# Patient Record
Sex: MALE | Race: WHITE | URBAN - METROPOLITAN AREA
[De-identification: names, ages, dates, MRNs, and addresses within clinical notes are randomized per-mention and may not be internally consistent; named-entity substitution may affect disease eponyms.]

---

## 2022-11-17 ENCOUNTER — OFFICE (OUTPATIENT)
Dept: URBAN - METROPOLITAN AREA CLINIC 115 | Facility: CLINIC | Age: 62
Setting detail: OPHTHALMOLOGY
End: 2022-11-17
Payer: COMMERCIAL

## 2022-11-17 DIAGNOSIS — H25.13: ICD-10-CM

## 2022-11-17 DIAGNOSIS — E11.3393: ICD-10-CM

## 2022-11-17 PROCEDURE — 92004 COMPRE OPH EXAM NEW PT 1/>: CPT | Performed by: OPHTHALMOLOGY

## 2022-11-17 ASSESSMENT — VISUAL ACUITY
OD_BCVA: 20/25
OS_BCVA: 20/150

## 2022-11-17 ASSESSMENT — REFRACTION_AUTOREFRACTION
OS_CYLINDER: -0.75
OD_AXIS: 105
OD_CYLINDER: -0.50
OD_SPHERE: +3.25
OS_SPHERE: +0.50
OS_AXIS: 082

## 2022-11-17 ASSESSMENT — SPHEQUIV_DERIVED
OD_SPHEQUIV: 3
OS_SPHEQUIV: 0.125

## 2022-11-17 ASSESSMENT — CONFRONTATIONAL VISUAL FIELD TEST (CVF)
OS_FINDINGS: FULL
OD_FINDINGS: FULL

## 2022-11-17 ASSESSMENT — TONOMETRY
OS_IOP_MMHG: 20
OD_IOP_MMHG: 17

## 2023-01-04 ENCOUNTER — RX ONLY (RX ONLY)
Age: 63
End: 2023-01-04

## 2023-01-04 ENCOUNTER — OFFICE (OUTPATIENT)
Dept: URBAN - METROPOLITAN AREA CLINIC 94 | Facility: CLINIC | Age: 63
Setting detail: OPHTHALMOLOGY
End: 2023-01-04
Payer: COMMERCIAL

## 2023-01-04 DIAGNOSIS — E11.3513: ICD-10-CM

## 2023-01-04 PROCEDURE — 92134 CPTRZ OPH DX IMG PST SGM RTA: CPT | Performed by: OPHTHALMOLOGY

## 2023-01-04 PROCEDURE — 92235 FLUORESCEIN ANGRPH MLTIFRAME: CPT | Performed by: OPHTHALMOLOGY

## 2023-01-04 PROCEDURE — 92012 INTRM OPH EXAM EST PATIENT: CPT | Performed by: OPHTHALMOLOGY

## 2023-01-04 ASSESSMENT — CONFRONTATIONAL VISUAL FIELD TEST (CVF)
OD_FINDINGS: FULL
OS_FINDINGS: FULL

## 2023-01-04 ASSESSMENT — SPHEQUIV_DERIVED
OS_SPHEQUIV: 0.125
OD_SPHEQUIV: 3

## 2023-01-04 ASSESSMENT — VISUAL ACUITY
OD_BCVA: 20/20-1
OS_BCVA: 20/100+1

## 2023-01-04 ASSESSMENT — REFRACTION_AUTOREFRACTION
OS_SPHERE: +0.50
OS_CYLINDER: -0.75
OS_AXIS: 082
OD_CYLINDER: -0.50
OD_SPHERE: +3.25
OD_AXIS: 105

## 2023-01-04 ASSESSMENT — TONOMETRY
OD_IOP_MMHG: 21
OS_IOP_MMHG: 18

## 2023-01-18 ENCOUNTER — OFFICE (OUTPATIENT)
Dept: URBAN - METROPOLITAN AREA CLINIC 105 | Facility: CLINIC | Age: 63
Setting detail: OPHTHALMOLOGY
End: 2023-01-18
Payer: COMMERCIAL

## 2023-01-18 DIAGNOSIS — E11.3511: ICD-10-CM

## 2023-01-18 DIAGNOSIS — E11.3512: ICD-10-CM

## 2023-01-18 PROCEDURE — 67028 INJECTION EYE DRUG: CPT | Performed by: OPHTHALMOLOGY

## 2023-01-18 PROCEDURE — 92134 CPTRZ OPH DX IMG PST SGM RTA: CPT | Performed by: OPHTHALMOLOGY

## 2023-01-18 ASSESSMENT — SPHEQUIV_DERIVED
OD_SPHEQUIV: 3
OS_SPHEQUIV: 0.125

## 2023-01-18 ASSESSMENT — REFRACTION_AUTOREFRACTION
OS_AXIS: 082
OD_SPHERE: +3.25
OD_CYLINDER: -0.50
OS_SPHERE: +0.50
OS_CYLINDER: -0.75
OD_AXIS: 105

## 2023-01-18 ASSESSMENT — VISUAL ACUITY
OS_BCVA: 20/200
OD_BCVA: 20/25-1

## 2023-01-18 ASSESSMENT — CONFRONTATIONAL VISUAL FIELD TEST (CVF)
OS_FINDINGS: FULL
OD_FINDINGS: FULL

## 2023-01-25 ENCOUNTER — OFFICE (OUTPATIENT)
Dept: URBAN - METROPOLITAN AREA CLINIC 94 | Facility: CLINIC | Age: 63
Setting detail: OPHTHALMOLOGY
End: 2023-01-25
Payer: COMMERCIAL

## 2023-01-25 DIAGNOSIS — E11.3512: ICD-10-CM

## 2023-01-25 DIAGNOSIS — E11.3511: ICD-10-CM

## 2023-01-25 PROCEDURE — 67028 INJECTION EYE DRUG: CPT | Performed by: OPHTHALMOLOGY

## 2023-01-25 PROCEDURE — 92134 CPTRZ OPH DX IMG PST SGM RTA: CPT | Performed by: OPHTHALMOLOGY

## 2023-01-25 ASSESSMENT — REFRACTION_AUTOREFRACTION
OD_AXIS: 105
OS_SPHERE: +0.50
OS_CYLINDER: -0.75
OD_SPHERE: +3.25
OS_AXIS: 082
OD_CYLINDER: -0.50

## 2023-01-25 ASSESSMENT — VISUAL ACUITY
OS_BCVA: 20/200
OD_BCVA: 20/25-1

## 2023-01-25 ASSESSMENT — SPHEQUIV_DERIVED
OS_SPHEQUIV: 0.125
OD_SPHEQUIV: 3

## 2023-02-07 ENCOUNTER — ASC (OUTPATIENT)
Dept: URBAN - METROPOLITAN AREA SURGERY 8 | Facility: SURGERY | Age: 63
Setting detail: OPHTHALMOLOGY
End: 2023-02-07
Payer: COMMERCIAL

## 2023-02-07 DIAGNOSIS — E11.3511: ICD-10-CM

## 2023-02-07 PROCEDURE — 67210 TREATMENT OF RETINAL LESION: CPT | Performed by: OPHTHALMOLOGY

## 2023-02-07 ASSESSMENT — TONOMETRY
OD_IOP_MMHG: 18
OS_IOP_MMHG: 20

## 2023-02-07 ASSESSMENT — CONFRONTATIONAL VISUAL FIELD TEST (CVF)
OD_FINDINGS: FULL
OS_FINDINGS: FULL

## 2023-02-08 PROBLEM — E11.3512 DM TYPE 2; RIGHT PROLIFERATIVE WITH ME, LEFT PROLIFERATIVE WITH ME: Status: ACTIVE | Noted: 2023-01-04

## 2023-02-08 PROBLEM — E11.3511 DM TYPE 2; RIGHT PROLIFERATIVE WITH ME, LEFT PROLIFERATIVE WITH ME: Status: ACTIVE | Noted: 2023-01-04

## 2023-02-08 ASSESSMENT — SPHEQUIV_DERIVED
OS_SPHEQUIV: 0.125
OD_SPHEQUIV: 3

## 2023-02-08 ASSESSMENT — REFRACTION_AUTOREFRACTION
OD_AXIS: 105
OD_SPHERE: +3.25
OS_SPHERE: +0.50
OS_CYLINDER: -0.75
OS_AXIS: 082
OD_CYLINDER: -0.50

## 2023-02-08 ASSESSMENT — VISUAL ACUITY
OS_BCVA: 20/100
OD_BCVA: 20/25-1

## 2023-02-15 ENCOUNTER — OFFICE (OUTPATIENT)
Dept: URBAN - METROPOLITAN AREA CLINIC 105 | Facility: CLINIC | Age: 63
Setting detail: OPHTHALMOLOGY
End: 2023-02-15
Payer: COMMERCIAL

## 2023-02-15 DIAGNOSIS — E11.3512: ICD-10-CM

## 2023-02-15 PROCEDURE — 67210 TREATMENT OF RETINAL LESION: CPT | Performed by: OPHTHALMOLOGY

## 2023-02-15 ASSESSMENT — REFRACTION_AUTOREFRACTION
OD_CYLINDER: -0.50
OD_SPHERE: +3.25
OS_SPHERE: +0.50
OS_CYLINDER: -0.75
OS_AXIS: 082
OD_AXIS: 105

## 2023-02-15 ASSESSMENT — SPHEQUIV_DERIVED
OS_SPHEQUIV: 0.125
OD_SPHEQUIV: 3

## 2023-02-15 ASSESSMENT — VISUAL ACUITY
OS_BCVA: 20/200
OD_BCVA: 20/30-

## 2023-02-15 ASSESSMENT — CONFRONTATIONAL VISUAL FIELD TEST (CVF)
OS_FINDINGS: FULL
OD_FINDINGS: FULL

## 2023-03-24 ENCOUNTER — OFFICE (OUTPATIENT)
Dept: URBAN - METROPOLITAN AREA CLINIC 105 | Facility: CLINIC | Age: 63
Setting detail: OPHTHALMOLOGY
End: 2023-03-24
Payer: COMMERCIAL

## 2023-03-24 DIAGNOSIS — E11.3512: ICD-10-CM

## 2023-03-24 DIAGNOSIS — E11.3511: ICD-10-CM

## 2023-03-24 PROCEDURE — 67028 INJECTION EYE DRUG: CPT | Performed by: OPHTHALMOLOGY

## 2023-03-24 PROCEDURE — 92134 CPTRZ OPH DX IMG PST SGM RTA: CPT | Performed by: OPHTHALMOLOGY

## 2023-03-24 ASSESSMENT — REFRACTION_AUTOREFRACTION
OS_CYLINDER: -0.75
OD_SPHERE: +3.25
OS_AXIS: 082
OD_CYLINDER: -0.50
OD_AXIS: 105
OS_SPHERE: +0.50

## 2023-03-24 ASSESSMENT — VISUAL ACUITY
OD_BCVA: 20/30
OS_BCVA: 20/100-

## 2023-03-24 ASSESSMENT — SPHEQUIV_DERIVED
OS_SPHEQUIV: 0.125
OD_SPHEQUIV: 3

## 2023-03-27 ENCOUNTER — OFFICE (OUTPATIENT)
Dept: URBAN - METROPOLITAN AREA CLINIC 105 | Facility: CLINIC | Age: 63
Setting detail: OPHTHALMOLOGY
End: 2023-03-27
Payer: COMMERCIAL

## 2023-03-27 DIAGNOSIS — E11.3512: ICD-10-CM

## 2023-03-27 DIAGNOSIS — E11.3511: ICD-10-CM

## 2023-03-27 PROCEDURE — 67028 INJECTION EYE DRUG: CPT | Performed by: OPHTHALMOLOGY

## 2023-03-27 PROCEDURE — 92134 CPTRZ OPH DX IMG PST SGM RTA: CPT | Performed by: OPHTHALMOLOGY

## 2023-03-27 ASSESSMENT — VISUAL ACUITY
OD_BCVA: 20/25-1
OS_BCVA: 20/100-

## 2023-03-27 ASSESSMENT — SPHEQUIV_DERIVED
OS_SPHEQUIV: 0.125
OD_SPHEQUIV: 3

## 2023-03-27 ASSESSMENT — REFRACTION_AUTOREFRACTION
OS_CYLINDER: -0.75
OS_AXIS: 082
OD_SPHERE: +3.25
OD_CYLINDER: -0.50
OD_AXIS: 105
OS_SPHERE: +0.50

## 2023-03-27 ASSESSMENT — CONFRONTATIONAL VISUAL FIELD TEST (CVF)
OS_FINDINGS: FULL
OD_FINDINGS: FULL

## 2023-04-14 ENCOUNTER — EMERGENCY (EMERGENCY)
Facility: HOSPITAL | Age: 63
LOS: 1 days | Discharge: LEFT BEFORE DISCHARGE | End: 2023-04-14
Attending: EMERGENCY MEDICINE | Admitting: EMERGENCY MEDICINE
Payer: COMMERCIAL

## 2023-04-14 VITALS
SYSTOLIC BLOOD PRESSURE: 143 MMHG | RESPIRATION RATE: 18 BRPM | OXYGEN SATURATION: 99 % | TEMPERATURE: 98 F | HEART RATE: 93 BPM | DIASTOLIC BLOOD PRESSURE: 79 MMHG

## 2023-04-14 VITALS
RESPIRATION RATE: 17 BRPM | OXYGEN SATURATION: 100 % | TEMPERATURE: 98 F | DIASTOLIC BLOOD PRESSURE: 74 MMHG | HEART RATE: 92 BPM | SYSTOLIC BLOOD PRESSURE: 150 MMHG

## 2023-04-14 PROBLEM — Z00.00 ENCOUNTER FOR PREVENTIVE HEALTH EXAMINATION: Status: ACTIVE | Noted: 2023-04-14

## 2023-04-14 LAB
ALBUMIN SERPL ELPH-MCNC: 4.5 G/DL — SIGNIFICANT CHANGE UP (ref 3.3–5)
ALP SERPL-CCNC: 99 U/L — SIGNIFICANT CHANGE UP (ref 40–120)
ALT FLD-CCNC: 19 U/L — SIGNIFICANT CHANGE UP (ref 4–41)
ANION GAP SERPL CALC-SCNC: 20 MMOL/L — HIGH (ref 7–14)
AST SERPL-CCNC: 20 U/L — SIGNIFICANT CHANGE UP (ref 4–40)
BILIRUB SERPL-MCNC: 0.7 MG/DL — SIGNIFICANT CHANGE UP (ref 0.2–1.2)
BUN SERPL-MCNC: 15 MG/DL — SIGNIFICANT CHANGE UP (ref 7–23)
CALCIUM SERPL-MCNC: 10 MG/DL — SIGNIFICANT CHANGE UP (ref 8.4–10.5)
CHLORIDE SERPL-SCNC: 96 MMOL/L — LOW (ref 98–107)
CO2 SERPL-SCNC: 21 MMOL/L — LOW (ref 22–31)
CREAT SERPL-MCNC: 0.74 MG/DL — SIGNIFICANT CHANGE UP (ref 0.5–1.3)
EGFR: 102 ML/MIN/1.73M2 — SIGNIFICANT CHANGE UP
GLUCOSE SERPL-MCNC: 166 MG/DL — HIGH (ref 70–99)
HCT VFR BLD CALC: 49.6 % — SIGNIFICANT CHANGE UP (ref 39–50)
HGB BLD-MCNC: 16.6 G/DL — SIGNIFICANT CHANGE UP (ref 13–17)
MCHC RBC-ENTMCNC: 29.8 PG — SIGNIFICANT CHANGE UP (ref 27–34)
MCHC RBC-ENTMCNC: 33.5 GM/DL — SIGNIFICANT CHANGE UP (ref 32–36)
MCV RBC AUTO: 89 FL — SIGNIFICANT CHANGE UP (ref 80–100)
NRBC # BLD: 0 /100 WBCS — SIGNIFICANT CHANGE UP (ref 0–0)
NRBC # FLD: 0 K/UL — SIGNIFICANT CHANGE UP (ref 0–0)
PLATELET # BLD AUTO: 312 K/UL — SIGNIFICANT CHANGE UP (ref 150–400)
POTASSIUM SERPL-MCNC: 4.2 MMOL/L — SIGNIFICANT CHANGE UP (ref 3.5–5.3)
POTASSIUM SERPL-SCNC: 4.2 MMOL/L — SIGNIFICANT CHANGE UP (ref 3.5–5.3)
PROT SERPL-MCNC: 8.3 G/DL — SIGNIFICANT CHANGE UP (ref 6–8.3)
RBC # BLD: 5.57 M/UL — SIGNIFICANT CHANGE UP (ref 4.2–5.8)
RBC # FLD: 13 % — SIGNIFICANT CHANGE UP (ref 10.3–14.5)
SODIUM SERPL-SCNC: 137 MMOL/L — SIGNIFICANT CHANGE UP (ref 135–145)
WBC # BLD: 12.84 K/UL — HIGH (ref 3.8–10.5)
WBC # FLD AUTO: 12.84 K/UL — HIGH (ref 3.8–10.5)

## 2023-04-14 PROCEDURE — 99285 EMERGENCY DEPT VISIT HI MDM: CPT

## 2023-04-14 PROCEDURE — 99284 EMERGENCY DEPT VISIT MOD MDM: CPT

## 2023-04-14 NOTE — ED PROVIDER NOTE - OBJECTIVE STATEMENT
Patient with recent sinus surgery with polyp removal by Dr. Lenny Sanchez ([phone]). Patient had surgery 3 days ago and had stents removed in the office this morning, but kaushik-orbital swelling was appreciated on exam. Patient sent to the ER after Dr. Sanchez discussed with Dr. Warner for evaluation in the ER. I discussed patient with ENT resident who is consulting on patient in the ER. ENT requesting Patient with recent sinus surgery with polyp removal by Dr. Lenny Sanchez ([phone]). Patient had surgery 3 days ago and had stents removed in the office this morning, but kaushik-orbital swelling was appreciated on exam. Patient sent to the ER after Dr. Sanchez discussed with Dr. Warner for evaluation in the ER. I discussed patient with ENT resident who is consulting on patient in the ER. ENT requesting Ophthalmology consult which was requested. Patient without fever and currently without pain. Patient had pain yesterday that was relieved with stent removal today. Last tylenol was 3 hours ago.  No n/v, no sob, no cough. Patient reports periorbital swelling worse yesterday and improved today.

## 2023-04-14 NOTE — ED PROVIDER NOTE - CLINICAL SUMMARY MEDICAL DECISION MAKING FREE TEXT BOX
See HPI. Awaiting lab, CT results. ENT to consult. Ophtlal to consult. Have discussed with Dr. Sanchez.  Patient currently without pain. See HPI. Awaiting lab, CT results. ENT to consult. Ophtlal to consult. Have discussed with Dr. Sanchez.  Patient currently without pain.    16:10: Patient signed out to Dr. Connell. Patient has been awaiting to obtain a CT but would like to opt for an outpatient CT and not wait any longer. Discussed with patient following up with Dr. Sanchez to schedule an outpatient CT although he is currently 2nd on the list to obtain a CT. Patient symptoms are improving and is on Doxycycline however I reiterated why we are requesting the CT to fully evaluate if there is an infection. I convinced Mr. Wilson to continue to wait another 30 min while we attempt to obtain the CT and evaluate his sinus area more thoroughly.  Patient is not in any pain or discomfort.

## 2023-04-14 NOTE — ED ADULT TRIAGE NOTE - CHIEF COMPLAINT QUOTE
sinus surgery 4/11, swelling redness ,pain l eye since yesterday with discharge.  denies visual disturbance.  fs [phone]

## 2023-04-14 NOTE — CONSULT NOTE ADULT - ATTENDING COMMENTS
62y male with recent FESS 3 days ago presenting with left periorbital swelling and chemosis OS of 1 day duration. F/u CT maxillofacial. Vision intact and no evidence of optic nerve dysfunction. Continue antibiotics. Agree with plan above.

## 2023-04-14 NOTE — ED PROVIDER NOTE - PATIENT PORTAL LINK FT
You can access the FollowMyHealth Patient Portal offered by [facility] by registering at the following website: [email or web address]. By joining [facility]’s FollowMyHealth portal, you will also be able to view your health information using other applications (apps) compatible with our system.

## 2023-04-14 NOTE — ED PROVIDER NOTE - NSFOLLOWUPINSTRUCTIONS_ED_ALL_ED_FT
You were seen in the Emergency Department for eye swelling and pain.     1) Advance activity as tolerated.   2) New prescription sent to pharmacy indicated in interview take prescription as prescribed. Continue all previously prescribed medications as directed.    3) Follow up with Dr. Sanchez as soon as possible - take copies of your results.   4) Return to the Emergency Department for worsening or persistent symptoms, and/or ANY NEW OR CONCERNING SYMPTOMS.

## 2023-04-14 NOTE — CONSULT NOTE ADULT - ASSESSMENT
Assessment/Plan: 62y Male PMH DM, HTN, HLD, POD3 s/p FESS with polyp removal (Dr. Sanchez) for odontogenic sinusitis, now presenting with 2 days of L eye erythema and swelling, with improvement in symptoms today compared to yesterday. On exam, has L eye erythema, moderate periorbital edema, and watery discharge.    - CT maxillofacial with IV contrast  - CBC / BMP  - Ophthalmology consult re L eye erythema    Plan discussed with Dr. Warner.    Please page ENT, #[phone] with any questions or concerns.
Assessment and Recommendations:  62y male w/ pmhx/ochx of DM, HTN, HLD, recent FESS 3 days ago presenting with left periorbital swelling and chemosis OS of 1 day duration.    # left periorbital swelling  # chemosis OS  - pending CT maxillofacial  - EOMs symmetrical OU, visual acuity intact OS. No signs of optic nerve dysfunction  - start preservative free artificial tears, one drop four times a day to left eye for comfort  - rest of care per ENT    # diabetic retinopathy OU  - preretinal heme OD and scattered DBH OS  - followed by Dr. Bell, has appointment on 4/28  - BG control per primary team    Pt seen and discussed with Dr. Meeks, attending.     Outpatient follow-up: Patient should follow-up with his/her ophthalmologist or with [facility] Department of Ophthalmology upon discharge at the address below     [address] [place] [address]. [address]  [place] [address]  [phone]

## 2023-04-14 NOTE — ED PROVIDER NOTE - PROGRESS NOTE DETAILS
The patient wishes to be discharged against medical advice. I have assessed the patient's mental status and  the patient has capacity to make this decision. I have explained the risks of leaving without full treatment, including severe disability and death, which the patient understands and is willing to accept. I have answered all of the patient's questions. I reiterated my medical opinion and advised the patient to return at any time. We discussed the further workup outside of the current visit and follow up and return precautions.    Patient’s prescription sent to their pharmacy indicated during interview.  Plan to discharge patient. Patient agrees with plan. Patient refused CT imaging, and wait for opthalmology evaluation, explained to patient the risk of declining evaluation which include worsening quality of life and possible death.  With repeat explanation over the phone by Dr. Sanchez ([phone]). Patient repeated understanding,  exemplifies understanding of risks, and reports that he just wants to be discharged home against medical advice. Patient signed AMA form. Patient given emergent PCP follow up and return precautions. Patient agrees with plan.    Dr. Sanchez recommended discharge with Clindamycin TID for presentation. Patient refused CT imaging, and wait for opthalmology evaluation, explained to patient the risk of declining evaluation which include worsening quality of life and possible death.  With repeat explanation over the phone by Dr. Sanchez ([phone]). Dr. Sanchez given the severity of presentation strongly recommended AMA discharge. Patient repeated understanding, exemplifies understanding of risks, and reports that he just wants to be discharged home against medical advice. Patient signed AMA form. Patient given emergent PCP follow up and return precautions. Patient agrees with plan.    Dr. Sanchez recommended discharge with Clindamycin TID for presentation.

## 2023-04-14 NOTE — ED PROVIDER NOTE - PHYSICAL EXAMINATION
· Physical Examination: PHYSICAL EXAM:   · CONSTITUTIONAL:  Appearance: well appearing.  Development: well developed.  Distress: no apparent  · Manner: appropriate for situation.  Mentation: awake, alert, oriented to person, place, time/situation  · Mood: appropriate.  Nourishment: well  · Head Shape: normal cephalic, ATRAUMATIC  · EYES: left periorbital swelline with mild serous drainage, no visual deficit  · Nose: dried blood bilateral nasal  Mouth: normal mucosa,   · Throat: uvula midline, no vesicles, no redness, and no oropharyngeal exudate.  · CARDIAC:  CARDIAC RHYTHM: regular  CARDIAC RATE: normal  CARDIAC PEDAL EDEMA: absent  CARDIAC JVD: non-distended bilaterally  · CARDIAC PULSES: normal bilaterally  · RESPIRATORY:  Respiratory Distress: no  Breath Sounds: normal  · Chest Exam: normal, non-tender  · Abdominal Exam: soft, nondistended, nontender  · MUSCULOSKELETAL: Spine appears normal, range of motion is not limited, no muscle or joint tenderness  · NEUROLOGICAL: Alert and oriented, no focal deficits, no motor or sensory deficits.  · SKIN: Skin normal color for race, warm, dry and intact. No evidence of rash.  · PSYCHIATRIC: Alert and oriented to person, place, time/situation. normal mood and affect. no apparent risk to self or others.  · HEME LYMPH: No adenopathy or splenomegaly. No cervical or inguinal lymphadenopathy.

## 2023-04-14 NOTE — ED PROVIDER NOTE - NS ED ROS FT
Review of Systems:  · Constitutional: no chills, no fever, no night sweats, no weight loss  · Nose: positive epistaxis improving since surgery, left periorbital swelling  · Mouth/Throat: no difficulty in swallowing, trachea midline, uvula midline  · Respiratory: no cough, no exertional dyspnea, no hemoptysis, no orthopnea, no shortness of breath  · Gastrointestinal: no abdominal pain, no diarrhea, no melena, no nausea, no vomiting  · Genitourinary: no difficulty urinating, no dysuria, no hematuria  · MUSCULOSKELETAL: FROM of all extremities  · Skin: no abrasion; no bruising; no laceration  · Neurological: no change in level of consciousness, no headache, no seizures  · Psychiatric: no anxiety, no depression  · Endocrine: no excessive urination  · Heme/Lymph: no anemia, no easy bleeding  · Allergic/Immunologic: IMMUNIZATIONS UTD  · ROS STATEMENT: all other ROS negative except as per HPI

## 2023-04-14 NOTE — CONSULT NOTE ADULT - SUBJECTIVE AND OBJECTIVE BOX
[facility] DEPARTMENT OF OPHTHALMOLOGY - INITIAL ADULT CONSULT  -----------------------------------------------------------------------------------------------------------  Yajaira Reynoso MD PGY-3  -----------------------------------------------------------------------------------------------------------    HPI: Patient with recent sinus surgery with polyp removal by Dr. Lenny Sanchez ([phone]). Patient had surgery 3 days ago and had stents removed in the office this morning, but kaushik-orbital swelling was appreciated on exam. Patient sent to the ER after Dr. Sanchez discussed with Dr. Warner for evaluation in the ER. I discussed patient with ENT resident who is consulting on patient in the ER. ENT requesting Ophthalmology consult which was requested. Patient without fever and currently without pain. Patient had pain yesterday that was relieved with stent removal today. Last tylenol was 3 hours ago.  No n/v, no sob, no cough. Patient reports periorbital swelling worse yesterday and improved today.    Interval hx: history as above. Pt denies any new ocular complaints. Follows with Dr. Bell for PIPER. Has appointment with Dr. Bell on 4/28.     PAST MEDICAL & SURGICAL HISTORY:  H/O sinus surgery      Other specified diabetes mellitus        Past Ocular History: denies surg/laser  Ophthalmic Medications: none  FAMILY HISTORY:   no glc/amd  Social History: no etoh/tobacco    MEDICATIONS  (STANDING):    MEDICATIONS  (PRN):    Allergies & Intolerances:     Review of Systems:  Constitutional: No fever, chills  Eyes: No blurry vision, flashes, floaters, FBS, erythema, discharge, double vision, OU  Neuro: No tremors  Cardiovascular: No chest pain, palpitations  Respiratory: No SOB, no cough  GI: No nausea, vomiting, abdominal pain  : No dysuria  Skin: no rash  Psych: no depression  Endocrine: no polyuria, polydipsia  Heme/lymph: no swelling    VITALS: T(C): 36.7 (04-14-23 @ 15:31)  T(F): 98 (04-14-23 @ 15:31), Max: 98.2 (04-14-23 @ 09:39)  HR: 92 (04-14-23 @ 15:31) (91 - 93)  BP: 150/74 (04-14-23 @ 15:31) (143/79 - 150/74)  RR:  (17 - 18)  SpO2:  (99% - 100%)  Wt(kg): --  General: AAO x 3, appropriate mood and affect    Ophthalmology Exam:  Visual acuity (cc): 20/50 PH 20/30 OD, 20/20 OS  Pupils: PERRL OU, no APD  Ttono: 18, 21  Extraocular movements (EOMs): trace abduction deficit OU, no pain, no diplopia  Confrontational Visual Field (CVF): Full OU  Color Plates: 12/12 OU    Pen Light Exam (PLE)  External: wnl OD, periorbital erythema and edema OS   Lids/Lashes/Lacrimal Ducts: Flat OD, 1-2+ upp and lower lid erythema and edema OS  Sclera/Conjunctiva: W+Q OD, mild chemosis with 1+ injection OS  Cornea: Cl OU  Anterior Chamber: D+F OU    Iris: Flat OU  Lens: NS OU    Fundus Exam: dilated with 1% tropicamide and 2.5% phenylephrine  Approval obtained from primary team for dilation  Patient aware that pupils can remained dilated for at least 4-6 hours  Exam performed with 20D lens    Vitreous: preretinal heme OD, wnl OS  Disc, cup/disc: sharp and pink, 0.4 OU  Macula: wnl OU  Vessels: wnl OU  Periphery: DBH OU    Labs/Imaging:

## 2023-04-14 NOTE — CONSULT NOTE ADULT - SUBJECTIVE AND OBJECTIVE BOX
HPI: 62y Male PMH DM, HTN, HLD, POD3 s/p FESS with polyp removal (Dr. Sanchez) for odontogenic sinusitis, now presenting with 2 days of L eye erythema and swelling. Per patient, he woke yesterday morning with difficulty opening his L eye 2/2 swelling. He noticed his eye was erythematous with watery discharge, and he had eye pain. Since then, pain and swelling have both improved, and he is able to open his L eye. He has no double vision or blurry vision and no difficulty with extraocular movements. He denies HA, bleeding from nares or oral cavity, or fevers at home. Has been using nasal rinses and is on doxycycline for abx ppx.    Allergies    penicillins (Hives)    Intolerances    PAST MEDICAL & SURGICAL HISTORY:  H/O sinus surgery  Other specified diabetes mellitus    FAMILY HISTORY:    Vital Signs Last 24 Hrs  T(C): 36.8 (14 Apr 2023 09:39), Max: 36.8 (14 Apr 2023 09:39)  T(F): 98.2 (14 Apr 2023 09:39), Max: 98.2 (14 Apr 2023 09:39)  HR: 91 (14 Apr 2023 14:08) (91 - 93)  BP: 146/79 (14 Apr 2023 14:08) (143/79 - 146/79)  BP(mean): --  RR: 17 (14 Apr 2023 14:08) (17 - 18)  SpO2: 100% (14 Apr 2023 14:08) (99% - 100%)    Parameters below as of 14 Apr 2023 14:08  Patient On (Oxygen Delivery Method): room air      LABS:  CBC-    04-14    137  |  96<L>  |  15  ----------------------------<  166<H>  4.2   |  21<L>  |  0.74    Ca    10.0      14 Apr 2023 13:00    TPro  8.3  /  Alb  4.5  /  TBili  0.7  /  DBili  x   /  AST  20  /  ALT  19  /  AlkPhos  99  04-14    Coagulation Studies-    Endocrine Panel-  --  --  10.0 mg/dL    PHYSICAL EXAM:  ENT EXAM-   Constitutional: Well-developed, well-nourished.   Head:  normocephalic, atraumatic.   Ears: external ears normal  Eyes: L eye with conjunctival erythema and watery discharge. EOMI. L eye moderate periorbital edema  Nose: nares patent, bloody mucoid discharge from nares with bloody mucous in nasal cavities, c/w post-op changes  OC/OP:  MMM. Oropharynx without clot or bleeding  Neck: Trachea midline.  Lymph:  No cervical adenopathy.    MULTISYSTEM EXAM-  Neuro/Psych: A&O x 3. Mood stable. Affect bright  Cranial nerves: 2-12 grossly intact bilaterally  Pulm:  No dyspnea, non-labored breathing  Cardiovascular: regular rate   Skin:  No rash or lesions on exposed skin of head/neck

## 2023-04-17 ENCOUNTER — INPATIENT (INPATIENT)
Facility: HOSPITAL | Age: 63
LOS: 2 days | Discharge: HOME | End: 2023-04-20
Attending: OTOLARYNGOLOGY | Admitting: OTOLARYNGOLOGY
Payer: COMMERCIAL

## 2023-04-17 VITALS
OXYGEN SATURATION: 98 % | DIASTOLIC BLOOD PRESSURE: 92 MMHG | TEMPERATURE: 98 F | SYSTOLIC BLOOD PRESSURE: 184 MMHG | RESPIRATION RATE: 18 BRPM | HEART RATE: 90 BPM

## 2023-04-17 DIAGNOSIS — H05.012 CELLULITIS OF LEFT ORBIT: ICD-10-CM

## 2023-04-17 DIAGNOSIS — H57.89 OTHER SPECIFIED DISORDERS OF EYE AND ADNEXA: ICD-10-CM

## 2023-04-17 LAB
ANION GAP SERPL CALC-SCNC: 13 MMOL/L — SIGNIFICANT CHANGE UP (ref 7–14)
ANION GAP SERPL CALC-SCNC: 16 MMOL/L — HIGH (ref 7–14)
BASOPHILS # BLD AUTO: 0.07 K/UL — SIGNIFICANT CHANGE UP (ref 0–0.2)
BASOPHILS NFR BLD AUTO: 0.7 % — SIGNIFICANT CHANGE UP (ref 0–2)
BUN SERPL-MCNC: 15 MG/DL — SIGNIFICANT CHANGE UP (ref 7–23)
BUN SERPL-MCNC: 17 MG/DL — SIGNIFICANT CHANGE UP (ref 7–23)
CALCIUM SERPL-MCNC: 9.3 MG/DL — SIGNIFICANT CHANGE UP (ref 8.4–10.5)
CALCIUM SERPL-MCNC: 9.4 MG/DL — SIGNIFICANT CHANGE UP (ref 8.4–10.5)
CHLORIDE SERPL-SCNC: 100 MMOL/L — SIGNIFICANT CHANGE UP (ref 98–107)
CHLORIDE SERPL-SCNC: 103 MMOL/L — SIGNIFICANT CHANGE UP (ref 98–107)
CO2 SERPL-SCNC: 19 MMOL/L — LOW (ref 22–31)
CO2 SERPL-SCNC: 22 MMOL/L — SIGNIFICANT CHANGE UP (ref 22–31)
CREAT SERPL-MCNC: 0.61 MG/DL — SIGNIFICANT CHANGE UP (ref 0.5–1.3)
CREAT SERPL-MCNC: 0.69 MG/DL — SIGNIFICANT CHANGE UP (ref 0.5–1.3)
CRP SERPL-MCNC: 19.1 MG/L — HIGH
EGFR: 105 ML/MIN/1.73M2 — SIGNIFICANT CHANGE UP
EGFR: 109 ML/MIN/1.73M2 — SIGNIFICANT CHANGE UP
EOSINOPHIL # BLD AUTO: 0.08 K/UL — SIGNIFICANT CHANGE UP (ref 0–0.5)
EOSINOPHIL NFR BLD AUTO: 0.8 % — SIGNIFICANT CHANGE UP (ref 0–6)
ERYTHROCYTE [SEDIMENTATION RATE] IN BLOOD: 28 MM/HR — HIGH (ref 1–15)
GLUCOSE SERPL-MCNC: 180 MG/DL — HIGH (ref 70–99)
GLUCOSE SERPL-MCNC: 231 MG/DL — HIGH (ref 70–99)
HCT VFR BLD CALC: 41.9 % — SIGNIFICANT CHANGE UP (ref 39–50)
HCT VFR BLD CALC: 45.1 % — SIGNIFICANT CHANGE UP (ref 39–50)
HGB BLD-MCNC: 14.2 G/DL — SIGNIFICANT CHANGE UP (ref 13–17)
HGB BLD-MCNC: 15.3 G/DL — SIGNIFICANT CHANGE UP (ref 13–17)
IANC: 6.2 K/UL — SIGNIFICANT CHANGE UP (ref 1.8–7.4)
IMM GRANULOCYTES NFR BLD AUTO: 0.4 % — SIGNIFICANT CHANGE UP (ref 0–0.9)
LYMPHOCYTES # BLD AUTO: 2.64 K/UL — SIGNIFICANT CHANGE UP (ref 1–3.3)
LYMPHOCYTES # BLD AUTO: 26.5 % — SIGNIFICANT CHANGE UP (ref 13–44)
MAGNESIUM SERPL-MCNC: 1.9 MG/DL — SIGNIFICANT CHANGE UP (ref 1.6–2.6)
MAGNESIUM SERPL-MCNC: 2.1 MG/DL — SIGNIFICANT CHANGE UP (ref 1.6–2.6)
MCHC RBC-ENTMCNC: 29.8 PG — SIGNIFICANT CHANGE UP (ref 27–34)
MCHC RBC-ENTMCNC: 29.9 PG — SIGNIFICANT CHANGE UP (ref 27–34)
MCHC RBC-ENTMCNC: 33.9 GM/DL — SIGNIFICANT CHANGE UP (ref 32–36)
MCHC RBC-ENTMCNC: 33.9 GM/DL — SIGNIFICANT CHANGE UP (ref 32–36)
MCV RBC AUTO: 88 FL — SIGNIFICANT CHANGE UP (ref 80–100)
MCV RBC AUTO: 88.1 FL — SIGNIFICANT CHANGE UP (ref 80–100)
MONOCYTES # BLD AUTO: 0.95 K/UL — HIGH (ref 0–0.9)
MONOCYTES NFR BLD AUTO: 9.5 % — SIGNIFICANT CHANGE UP (ref 2–14)
NEUTROPHILS # BLD AUTO: 6.2 K/UL — SIGNIFICANT CHANGE UP (ref 1.8–7.4)
NEUTROPHILS NFR BLD AUTO: 62.1 % — SIGNIFICANT CHANGE UP (ref 43–77)
NRBC # BLD: 0 /100 WBCS — SIGNIFICANT CHANGE UP (ref 0–0)
NRBC # BLD: 0 /100 WBCS — SIGNIFICANT CHANGE UP (ref 0–0)
NRBC # FLD: 0 K/UL — SIGNIFICANT CHANGE UP (ref 0–0)
NRBC # FLD: 0 K/UL — SIGNIFICANT CHANGE UP (ref 0–0)
PHOSPHATE SERPL-MCNC: 3.6 MG/DL — SIGNIFICANT CHANGE UP (ref 2.5–4.5)
PHOSPHATE SERPL-MCNC: SIGNIFICANT CHANGE UP MG/DL (ref 2.5–4.5)
PLATELET # BLD AUTO: 304 K/UL — SIGNIFICANT CHANGE UP (ref 150–400)
PLATELET # BLD AUTO: 309 K/UL — SIGNIFICANT CHANGE UP (ref 150–400)
POTASSIUM SERPL-MCNC: 3.7 MMOL/L — SIGNIFICANT CHANGE UP (ref 3.5–5.3)
POTASSIUM SERPL-MCNC: SIGNIFICANT CHANGE UP MMOL/L (ref 3.5–5.3)
POTASSIUM SERPL-SCNC: 3.7 MMOL/L — SIGNIFICANT CHANGE UP (ref 3.5–5.3)
POTASSIUM SERPL-SCNC: SIGNIFICANT CHANGE UP MMOL/L (ref 3.5–5.3)
RBC # BLD: 4.76 M/UL — SIGNIFICANT CHANGE UP (ref 4.2–5.8)
RBC # BLD: 5.12 M/UL — SIGNIFICANT CHANGE UP (ref 4.2–5.8)
RBC # FLD: 12.8 % — SIGNIFICANT CHANGE UP (ref 10.3–14.5)
RBC # FLD: 13.2 % — SIGNIFICANT CHANGE UP (ref 10.3–14.5)
SODIUM SERPL-SCNC: 135 MMOL/L — SIGNIFICANT CHANGE UP (ref 135–145)
SODIUM SERPL-SCNC: 138 MMOL/L — SIGNIFICANT CHANGE UP (ref 135–145)
WBC # BLD: 9.65 K/UL — SIGNIFICANT CHANGE UP (ref 3.8–10.5)
WBC # BLD: 9.98 K/UL — SIGNIFICANT CHANGE UP (ref 3.8–10.5)
WBC # FLD AUTO: 9.65 K/UL — SIGNIFICANT CHANGE UP (ref 3.8–10.5)
WBC # FLD AUTO: 9.98 K/UL — SIGNIFICANT CHANGE UP (ref 3.8–10.5)

## 2023-04-17 PROCEDURE — 70487 CT MAXILLOFACIAL W/DYE: CPT | Mod: 26,MA

## 2023-04-17 PROCEDURE — 99285 EMERGENCY DEPT VISIT HI MDM: CPT

## 2023-04-17 RX ORDER — DEXAMETHASONE 0.5 MG/5ML
8 ELIXIR ORAL EVERY 8 HOURS
Refills: 0 | Status: DISCONTINUED | OUTPATIENT
Start: 2023-04-17 | End: 2023-04-20

## 2023-04-17 RX ORDER — METRONIDAZOLE 500 MG
500 TABLET ORAL EVERY 8 HOURS
Refills: 0 | Status: DISCONTINUED | OUTPATIENT
Start: 2023-04-17 | End: 2023-04-20

## 2023-04-17 RX ORDER — VANCOMYCIN HCL 1 G
1250 VIAL (EA) INTRAVENOUS ONCE
Refills: 0 | Status: COMPLETED | OUTPATIENT
Start: 2023-04-17 | End: 2023-04-17

## 2023-04-17 RX ADMIN — Medication 100 MILLIGRAM(S): at 23:08

## 2023-04-17 RX ADMIN — Medication 166.67 MILLIGRAM(S): at 23:08

## 2023-04-17 NOTE — CONSULT NOTE ADULT - ASSESSMENT
62 yo M POD 7 s/p FESS, developed left eye swelling on POD 2. NOw with significant improvement in eye swelling. No visual complaints. CT scan done     - Dispo pending results of CT  - Please obtain ophtho consult to ensure no worsening of left eye   - ENT following  62 yo M POD 7 s/p FESS, developed left eye swelling on POD 2. NOw with significant improvement in eye swelling. No visual complaints. CT scan done, concern for collection    - Admit to ENT   - IV abx   - Decadron q8 hrs   - Ophtho consult

## 2023-04-17 NOTE — ED PROVIDER NOTE - NS ED ATTENDING STATEMENT MOD
This was a shared visit with the JOSE CARLOS. I reviewed and verified the documentation and independently performed the documented:

## 2023-04-17 NOTE — ED PROVIDER NOTE - PROGRESS NOTE DETAILS
ELIZABETH Juarez- Pt seen by ENT. CT +for abscess. Plan for admission. Pt okay with admission. Will admit to Dr. Warner.

## 2023-04-17 NOTE — H&P ADULT - PROBLEM SELECTOR PLAN 1
- Admit to ENT - under    - IV abx (vancomycin and ceftriaxone)   - Decadron q8 hrs   - Ophtho consult - Admit to ENT - under    - IV abx  vanc/ flagyl  - Decadron q8 hrs   - Ophtho consult

## 2023-04-17 NOTE — CONSULT NOTE ADULT - SUBJECTIVE AND OBJECTIVE BOX
HPI:       62 yo M POD 7 s/p FESS. Patient had left eye swelling and redness post-operatively. Patient came to ED Friday and was found to have improving eye swelling. He was recommended CT but left ama. He returns today for CT scan. He state swelling is significantly improved. Denies pain with eye movement, no blurry vision, no vision changes, no headaches, no fever or chills.     PE:  General: NAD, A+Ox3  No respiratory distress, stridor, or stertor  Voice quality: normal  Face:  Symmetric without masses or lesions  OU: PERRL, EOMI, no restricted movement. Left periorbital mild edema with conjunctival erythema  Nose: nasal cavity clear bilaterally, inferior turbinates normal, mucosa normal without crusting or bleeding  OC/OP: tongue normal, floor of mouth wnl, no masses or lesions, OP clear  Neck: soft/flat, no LAD  CNII-XII intact

## 2023-04-17 NOTE — H&P ADULT - HISTORY OF PRESENT ILLNESS
HPI: 62 yo M POD 7 s/p FESS. Patient had left eye swelling and redness post-operatively. Patient came to ED Friday and was found to have improving eye swelling. He was recommended CT but left ama. He returns today for CT scan. He state swelling is significantly improved. Denies pain with eye movement, no blurry vision, no vision changes, no headaches, no fever or chills.

## 2023-04-17 NOTE — ED PROVIDER NOTE - ATTENDING APP SHARED VISIT CONTRIBUTION OF CARE
62 y.o male with PMHx of HTN, HLD, DM, hx of cholecystectomy with recent polyp removal 4/11 by Dr. Sanchez who developed Lt periorbital swelling and redness 2 days s/p procedure and was sent to ED by his ENT 4/14 for labs and CT scan but patient left ama prior to scan being completed now returns to ED for CT scan. Pt was seen in ED by ENT and ophthalmology at that time. States since discharge he has been using an eye wash and also taking the clindamycin as directed. Admits he also was on doxycycline s/p his procedure. States swelling and redness improved. Still notes occasional pain of Lt eye when abducting. Mild tearing as well. Wears reading glasses only. Hx of poor vision of Rt eye since child. Denies fevers, chills, vision changes, n/v/d, abd pain, cp, sob, neck pain, dental pain, rashes or any other complaints.

## 2023-04-17 NOTE — H&P ADULT - NSHPLABSRESULTS_GEN_ALL_CORE
CBC:            15.3   9.98  )-----------( 309      ( 04-17-23 @ 19:05 )             45.1         Chem:         ( 04-17-23 @ 19:05 )    135  |  100  |  15  ----------------------------<  231<H>  TNP   |  19<L>  |  0.61

## 2023-04-17 NOTE — ED ADULT TRIAGE NOTE - CHIEF COMPLAINT QUOTE
pt s/p sinus surgery 4/11.  pt had polyps removed from left sinus, the day after surgery pt noticed swelling to his left eye.  pt has dried blood to upper lip area.  pt was seen here for the same complaint 3 days ago and AMA'd

## 2023-04-17 NOTE — ED PROVIDER NOTE - NSICDXPASTMEDICALHX_GEN_ALL_CORE_FT
PAST MEDICAL HISTORY:  Diabetes mellitus     H/O sinus surgery     Other specified diabetes mellitus

## 2023-04-17 NOTE — ED PROVIDER NOTE - CLINICAL SUMMARY MEDICAL DECISION MAKING FREE TEXT BOX
Patient is a 62 y.o male with PMHx of HTN, HLD, DM, hx of cholecystectomy with recent polyp removal 4/11 by Dr. Sanchez who developed Lt periorbital swelling and redness 2 days s/p procedure and was sent to ED by his ENT 4/14 for labs and CT scan but patient left ama prior to scan being completed now returns to ED for CT scan. DDx- periorbital cellulitis r/o abscess. Plan for Labs and CT. Will monitor and reassess.

## 2023-04-17 NOTE — ED PROVIDER NOTE - PHYSICAL EXAMINATION
Vital signs reviewed.   CONSTITUTIONAL: Well-appearing; in no apparent distress. Non-toxic appearing.   HEAD: Normocephalic, atraumatic.  EYES: PERRL, EOM intact but with mild discomfort with abduction of Lt eye. +conjunctival injection of Lt eye. Visual acuity 20/50 Rt, 20/25 Lt, 20/25 both. No pus/discharge appreciated. +mild periorbital erythema of Lt eye. Rt eye normal.   ENT: normal nose; no rhinorrhea; normal pharynx with no tonsillar hypertrophy, no erythema, no exudate, no lymphadenopathy.  NECK/LYMPH: Supple; non-tender  CARD: Normal S1, S2;   RESP: Normal chest excursion with respiration; breath sounds clear and equal bilaterally; no wheezes, rhonchi, or rales.  EXT/MS: moves all extremities  SKIN: No rashes noted.   NEURO: Awake, alert, oriented x 3, no gross deficits  PSYCH: Normal mood; appropriate affect.

## 2023-04-17 NOTE — H&P ADULT - NSHPPHYSICALEXAM_GEN_ALL_CORE
Physical Exam:  General: NAD, A+Ox3  No respiratory distress, stridor, or stertor  Voice quality: normal  Face:  Symmetric without masses or lesions  OU: PERRL, EOMI, no restricted movement. Left periorbital mild edema with conjunctival erythema  Nose: nasal cavity clear bilaterally, inferior turbinates normal, mucosa normal without crusting or bleeding  OC/OP: tongue normal, floor of mouth wnl, no masses or lesions, OP clear  Neck: soft/flat, no LAD  CNII-XII intact

## 2023-04-17 NOTE — ED PROVIDER NOTE - OBJECTIVE STATEMENT
WAITING ON PT'S RIDE HOME.   Patient is a 62 y.o male with PMHx of HTN, HLD, DM, hx of cholecystectomy with recent polyp removal 4/11 by Dr. Sanchez who developed Lt periorbital swelling and redness 2 days s/p procedure and was sent to ED by his ENT 4/14 for labs and CT scan but patient left ama prior to scan being completed now returns to ED for CT scan. Pt was seen in ED by ENT and ophthalmology at that time. States since discharge he has been using an eye wash and also taking the clindamycin as directed. Admits he also was on doxycycline s/p his procedure. States swelling and redness improved. Still notes occasional pain of Lt eye when abducting. Mild tearing as well. Wears reading glasses only. Hx of poor vision of Rt eye since child. Denies fevers, chills, vision changes, n/v/d, abd pain, cp, sob, neck pain, dental pain, rashes or any other complaints.

## 2023-04-17 NOTE — ED ADULT NURSE NOTE - OBJECTIVE STATEMENT
Pt with recent sinus surgery. Pt c/o eye swelling and bleeding from nares. Pt sent for evaluation. IVL placed. Bloods drawn. Safety maintained. Will continue to monitor.

## 2023-04-17 NOTE — H&P ADULT - ASSESSMENT
62 yo M POD 7 s/p FESS, developed left eye swelling on POD 2. NOw with significant improvement in eye swelling. No visual complaints. CT scan done, concern for collection

## 2023-04-18 ENCOUNTER — TRANSCRIPTION ENCOUNTER (OUTPATIENT)
Age: 63
End: 2023-04-18

## 2023-04-18 DIAGNOSIS — Z29.9 ENCOUNTER FOR PROPHYLACTIC MEASURES, UNSPECIFIED: ICD-10-CM

## 2023-04-18 DIAGNOSIS — E11.9 TYPE 2 DIABETES MELLITUS WITHOUT COMPLICATIONS: ICD-10-CM

## 2023-04-18 LAB
ALBUMIN SERPL ELPH-MCNC: 3.5 G/DL — SIGNIFICANT CHANGE UP (ref 3.3–5)
ALP SERPL-CCNC: 81 U/L — SIGNIFICANT CHANGE UP (ref 40–120)
ALT FLD-CCNC: 15 U/L — SIGNIFICANT CHANGE UP (ref 4–41)
ANION GAP SERPL CALC-SCNC: 15 MMOL/L — HIGH (ref 7–14)
APTT BLD: 26.8 SEC — LOW (ref 27–36.3)
AST SERPL-CCNC: 18 U/L — SIGNIFICANT CHANGE UP (ref 4–40)
BILIRUB SERPL-MCNC: 0.4 MG/DL — SIGNIFICANT CHANGE UP (ref 0.2–1.2)
BLD GP AB SCN SERPL QL: NEGATIVE — SIGNIFICANT CHANGE UP
BUN SERPL-MCNC: 13 MG/DL — SIGNIFICANT CHANGE UP (ref 7–23)
CALCIUM SERPL-MCNC: 8.9 MG/DL — SIGNIFICANT CHANGE UP (ref 8.4–10.5)
CHLORIDE SERPL-SCNC: 104 MMOL/L — SIGNIFICANT CHANGE UP (ref 98–107)
CO2 SERPL-SCNC: 23 MMOL/L — SIGNIFICANT CHANGE UP (ref 22–31)
CREAT SERPL-MCNC: 0.6 MG/DL — SIGNIFICANT CHANGE UP (ref 0.5–1.3)
EGFR: 109 ML/MIN/1.73M2 — SIGNIFICANT CHANGE UP
FLUAV AG NPH QL: SIGNIFICANT CHANGE UP
FLUBV AG NPH QL: SIGNIFICANT CHANGE UP
GLUCOSE BLDC GLUCOMTR-MCNC: 131 MG/DL — HIGH (ref 70–99)
GLUCOSE BLDC GLUCOMTR-MCNC: 222 MG/DL — HIGH (ref 70–99)
GLUCOSE BLDC GLUCOMTR-MCNC: 292 MG/DL — HIGH (ref 70–99)
GLUCOSE SERPL-MCNC: 164 MG/DL — HIGH (ref 70–99)
HCT VFR BLD CALC: 42.6 % — SIGNIFICANT CHANGE UP (ref 39–50)
HGB BLD-MCNC: 14.8 G/DL — SIGNIFICANT CHANGE UP (ref 13–17)
INR BLD: 1.11 RATIO — SIGNIFICANT CHANGE UP (ref 0.88–1.16)
MCHC RBC-ENTMCNC: 31 PG — SIGNIFICANT CHANGE UP (ref 27–34)
MCHC RBC-ENTMCNC: 34.7 GM/DL — SIGNIFICANT CHANGE UP (ref 32–36)
MCV RBC AUTO: 89.1 FL — SIGNIFICANT CHANGE UP (ref 80–100)
NRBC # BLD: 0 /100 WBCS — SIGNIFICANT CHANGE UP (ref 0–0)
NRBC # FLD: 0 K/UL — SIGNIFICANT CHANGE UP (ref 0–0)
PLATELET # BLD AUTO: 292 K/UL — SIGNIFICANT CHANGE UP (ref 150–400)
POTASSIUM SERPL-MCNC: 3.7 MMOL/L — SIGNIFICANT CHANGE UP (ref 3.5–5.3)
POTASSIUM SERPL-SCNC: 3.7 MMOL/L — SIGNIFICANT CHANGE UP (ref 3.5–5.3)
PROT SERPL-MCNC: 6.6 G/DL — SIGNIFICANT CHANGE UP (ref 6–8.3)
PROTHROM AB SERPL-ACNC: 12.9 SEC — SIGNIFICANT CHANGE UP (ref 10.5–13.4)
RBC # BLD: 4.78 M/UL — SIGNIFICANT CHANGE UP (ref 4.2–5.8)
RBC # FLD: 13.2 % — SIGNIFICANT CHANGE UP (ref 10.3–14.5)
RH IG SCN BLD-IMP: POSITIVE — SIGNIFICANT CHANGE UP
RSV RNA NPH QL NAA+NON-PROBE: SIGNIFICANT CHANGE UP
SARS-COV-2 RNA SPEC QL NAA+PROBE: SIGNIFICANT CHANGE UP
SODIUM SERPL-SCNC: 142 MMOL/L — SIGNIFICANT CHANGE UP (ref 135–145)
WBC # BLD: 7.9 K/UL — SIGNIFICANT CHANGE UP (ref 3.8–10.5)
WBC # FLD AUTO: 7.9 K/UL — SIGNIFICANT CHANGE UP (ref 3.8–10.5)

## 2023-04-18 PROCEDURE — 99223 1ST HOSP IP/OBS HIGH 75: CPT

## 2023-04-18 RX ORDER — INSULIN LISPRO 100/ML
2 VIAL (ML) SUBCUTANEOUS
Refills: 0 | Status: DISCONTINUED | OUTPATIENT
Start: 2023-04-18 | End: 2023-04-18

## 2023-04-18 RX ORDER — GLUCAGON INJECTION, SOLUTION 0.5 MG/.1ML
1 INJECTION, SOLUTION SUBCUTANEOUS ONCE
Refills: 0 | Status: DISCONTINUED | OUTPATIENT
Start: 2023-04-18 | End: 2023-04-20

## 2023-04-18 RX ORDER — DEXTROSE 50 % IN WATER 50 %
25 SYRINGE (ML) INTRAVENOUS ONCE
Refills: 0 | Status: DISCONTINUED | OUTPATIENT
Start: 2023-04-18 | End: 2023-04-19

## 2023-04-18 RX ORDER — DEXTROSE 50 % IN WATER 50 %
12.5 SYRINGE (ML) INTRAVENOUS ONCE
Refills: 0 | Status: DISCONTINUED | OUTPATIENT
Start: 2023-04-18 | End: 2023-04-19

## 2023-04-18 RX ORDER — SODIUM CHLORIDE 9 MG/ML
1000 INJECTION, SOLUTION INTRAVENOUS
Refills: 0 | Status: DISCONTINUED | OUTPATIENT
Start: 2023-04-18 | End: 2023-04-19

## 2023-04-18 RX ORDER — SODIUM CHLORIDE 9 MG/ML
1000 INJECTION, SOLUTION INTRAVENOUS
Refills: 0 | Status: DISCONTINUED | OUTPATIENT
Start: 2023-04-18 | End: 2023-04-20

## 2023-04-18 RX ORDER — INSULIN GLARGINE 100 [IU]/ML
7 INJECTION, SOLUTION SUBCUTANEOUS AT BEDTIME
Refills: 0 | Status: DISCONTINUED | OUTPATIENT
Start: 2023-04-18 | End: 2023-04-18

## 2023-04-18 RX ORDER — INSULIN GLARGINE 100 [IU]/ML
5 INJECTION, SOLUTION SUBCUTANEOUS AT BEDTIME
Refills: 0 | Status: DISCONTINUED | OUTPATIENT
Start: 2023-04-18 | End: 2023-04-18

## 2023-04-18 RX ORDER — DEXTROSE 50 % IN WATER 50 %
15 SYRINGE (ML) INTRAVENOUS ONCE
Refills: 0 | Status: DISCONTINUED | OUTPATIENT
Start: 2023-04-18 | End: 2023-04-19

## 2023-04-18 RX ORDER — INSULIN LISPRO 100/ML
VIAL (ML) SUBCUTANEOUS
Refills: 0 | Status: DISCONTINUED | OUTPATIENT
Start: 2023-04-18 | End: 2023-04-19

## 2023-04-18 RX ORDER — VANCOMYCIN HCL 1 G
1750 VIAL (EA) INTRAVENOUS EVERY 12 HOURS
Refills: 0 | Status: DISCONTINUED | OUTPATIENT
Start: 2023-04-18 | End: 2023-04-20

## 2023-04-18 RX ADMIN — Medication 3: at 17:57

## 2023-04-18 RX ADMIN — Medication 1 DROP(S): at 13:23

## 2023-04-18 RX ADMIN — Medication 100 MILLIGRAM(S): at 16:14

## 2023-04-18 RX ADMIN — Medication 101.6 MILLIGRAM(S): at 12:18

## 2023-04-18 RX ADMIN — SODIUM CHLORIDE 100 MILLILITER(S): 9 INJECTION, SOLUTION INTRAVENOUS at 13:19

## 2023-04-18 RX ADMIN — Medication 1 DROP(S): at 17:58

## 2023-04-18 RX ADMIN — Medication 250 MILLIGRAM(S): at 13:30

## 2023-04-18 RX ADMIN — Medication 100 MILLIGRAM(S): at 07:59

## 2023-04-18 RX ADMIN — Medication 1 DROP(S): at 08:01

## 2023-04-18 RX ADMIN — SODIUM CHLORIDE 100 MILLILITER(S): 9 INJECTION, SOLUTION INTRAVENOUS at 16:14

## 2023-04-18 RX ADMIN — Medication 1 DROP(S): at 23:58

## 2023-04-18 RX ADMIN — Medication 101.6 MILLIGRAM(S): at 21:13

## 2023-04-18 RX ADMIN — Medication 100 MILLIGRAM(S): at 21:52

## 2023-04-18 NOTE — ED ADULT NURSE REASSESSMENT NOTE - NS ED NURSE REASSESS COMMENT FT1
Pt was received from handoff. pt is AxO 4. pt respirations are even and unlabored. pt denies any complaints at this time. Will continue to monitor. 08:30 Note: Pt was received from handoff. pt is AxO 4. pt respirations are even and unlabored. pt denies any complaints at this time. Will continue to monitor.

## 2023-04-18 NOTE — CONSULT NOTE ADULT - PROBLEM SELECTOR RECOMMENDATION 9
for drainage by ENT jerome  IV abx per ENT and Opth  pre-op - TTE and NST test from pt's out-pt cardiologist Dr Ferguson obtained - normal cardiac fx and neg NST 1/2023  pt asymptomatic and has good functional capacity  RCRI score 0, though reports uncontrolled A1C while not on insulin   pls obtain EKG here - if EKG ok, no objection to proceed to OR

## 2023-04-18 NOTE — CONSULT NOTE ADULT - PROBLEM SELECTOR RECOMMENDATION 2
reports most recent A1C as out-pt around 9  1 FS reading here 228  at home on Metformin, Glimepiride and Jardiance   started on steroids by ENT here - will need to anticipate steroid induced hyperglycemia  check A1C  will start basal insulin w Lantus 7 units and premeal admelog 2 units TID pre-meals for now and follow up further FS and NISS requirements and adjust accordingly  if A1C 9 or greater despite being on 3 oral regimens as home, would have endo input on need for new isulin on dc reports most recent A1C as out-pt around 9  1 FS reading here 228  at home on Metformin, Glimepiride and Jardiance   started on steroids by ENT here - will need to anticipate steroid induced hyperglycemia  check A1C  will start basal insulin w Lantus 5 units (pt npo p mn for OR and premeal Admelog 2 units TID pre-meals for now and follow up further FS and NISS requirements and adjust accordingly - anticipate inc lantus to 7 units when on diet after OR   if A1C 9 or greater despite being on 3 oral regimens as home, would have endo input on need for new insulin on dc reports most recent A1C as out-pt around 9  1 FS reading here 130  at home on Metformin, Glimepiride and Jardiance   started on steroids by ENT here - will need to anticipate steroid induced hyperglycemia  check A1C  will hold off on starting standing insulin regimen as of now since FS are controlled and start NISS   if A1C 9 or greater despite being on 3 oral regimens as home, would have endo input on need for new insulin on dc

## 2023-04-18 NOTE — CONSULT NOTE ADULT - SUBJECTIVE AND OBJECTIVE BOX
HPI: 62 yo M pmhx of DM2 not insulin, who presents POD 8 s/p FESS. Patient had left eye swelling and redness post-operatively. Patient came to ED Friday and was found to have improving eye swelling. He was recommended CT but left ama. He returns for CT scan. He state swelling is significantly improved. Denies pain with eye movement, no blurry vision, no vision changes, no headaches, no fever or chills. CT w concern for orbital cellulitis w possible plan for drainage by ENT. pt w hx of DM@ and reports his most recent A1C ~ 9. he states he takes metformin 1000mg BID, Glimepiride 4mg daily and Jardiance 25mg daily - has never been on isulin, does not follow w Endo. pt does follow w cardiologist Dr Ferguson from [facility] and had pre-op NST and TTE in Jan 2023 and was reportedly normal wo intervention - currently he denies any CP, SOB, palpitations LOC. no vision changes         Review of Systems:  Other Review of Systems: All other review of systems negative, except as noted in HPI      Allergies and Intolerances:        Allergies:  	penicillins: Drug Category, Hives        Patient History:    Past Medical, Past Surgical, and Family History:  PAST MEDICAL HISTORY:  Diabetes mellitus     H/O sinus surgery     Other specified diabetes mellitus.     Social History:  · Substance use	No     Family History:  . no pertinent family history           MEDICATIONS  (STANDING):  artificial tears (preservative free) Ophthalmic Solution 1 Drop(s) Left EYE four times a day  dexAMETHasone  IVPB 8 milliGRAM(s) IV Intermittent every 8 hours  lactated ringers. 1000 milliLiter(s) (100 mL/Hr) IV Continuous <Continuous>  metroNIDAZOLE  IVPB 500 milliGRAM(s) IV Intermittent every 8 hours  vancomycin  IVPB 1750 milliGRAM(s) IV Intermittent every 12 hours    MEDICATIONS  (PRN):      T(C): 36.7 (04-18-23 @ 10:38)  HR: 75 (04-18-23 @ 10:38)  BP: 150/79 (04-18-23 @ 10:38)  RR: 18 (04-18-23 @ 10:38)  SpO2: 96% (04-18-23 @ 10:38)  CAPILLARY BLOOD GLUCOSE        I&O's Summary      PHYSICAL EXAM:  General: NAD, A+Ox3  No respiratory distress, stridor, or stertor  Voice quality: normal  Face:  Symmetric without masses or lesions  OU: PERRL, EOMI, no restricted movement. Left periorbital mild edema with conjunctival erythema  Nose: nasal cavity clear bilaterally, inferior turbinates normal, mucosa normal without crusting or bleeding  OC/OP: tongue normal, floor of mouth wnl, no masses or lesions, OP clear  Neck: soft/flat, no LAD  CNII-XII intact    LABS:                        14.8   7.90  )-----------( 292      ( 18 Apr 2023 06:00 )             42.6     04-18    142  |  104  |  13  ----------------------------<  164<H>  3.7   |  23  |  0.60    Ca    8.9      18 Apr 2023 06:00  Phos  3.6     04-17  Mg     1.90     04-17    TPro  6.6  /  Alb  3.5  /  TBili  0.4  /  DBili  x   /  AST  18  /  ALT  15  /  AlkPhos  81  04-18    PT/INR - ( 18 Apr 2023 06:00 )   PT: 12.9 sec;   INR: 1.11 ratio         PTT - ( 18 Apr 2023 06:00 )  PTT:26.8 sec              RADIOLOGY & ADDITIONAL TESTS:    Imaging Personally Reviewed:    Consultant(s) Notes Reviewed:      Care Discussed with Consultants/Other Providers:

## 2023-04-18 NOTE — CONSULT NOTE ADULT - SUBJECTIVE AND OBJECTIVE BOX
[facility] DEPARTMENT OF OPHTHALMOLOGY - INITIAL ADULT CONSULT  -----------------------------------------------------------------------------------------------------------  Deyanira Jaramillo MD PGY 2  -----------------------------------------------------------------------------------------------------------    HPI Per ENT    62 yo M POD 7 s/p FESS. Patient had left eye swelling and redness post-operatively. Patient came to ED Friday and was found to have improving eye swelling. He was recommended CT but left ama. He returns today for CT scan. He state swelling is significantly improved. Denies pain with eye movement, no blurry vision, no vision changes, no headaches, no fever or chills.         Interval hx: history as above. Pt denies any new ocular complaints. Follows with Dr. Bell for PIPER. Has appointment with Dr. Bell on 4/28.     PAST MEDICAL & SURGICAL HISTORY:  H/O sinus surgery      Other specified diabetes mellitus        Past Ocular History: denies surg/laser  Ophthalmic Medications: none  FAMILY HISTORY:   no glc/amd  Social History: no etoh/tobacco    MEDICATIONS  (STANDING):    MEDICATIONS  (PRN):    Allergies & Intolerances:     Review of Systems:  Constitutional: No fever, chills  Eyes: No blurry vision, flashes, floaters, FBS, erythema, discharge, double vision, OU  Neuro: No tremors  Cardiovascular: No chest pain, palpitations  Respiratory: No SOB, no cough  GI: No nausea, vomiting, abdominal pain  : No dysuria  Skin: no rash  Psych: no depression  Endocrine: no polyuria, polydipsia  Heme/lymph: no swelling    VITALS: T(C): 37 (04-17-23 @ 23:35)  T(F): 98.6 (04-17-23 @ 23:35), Max: 98.6 (04-17-23 @ 23:35)  HR: 87 (04-17-23 @ 23:35) (79 - 90)  BP: 175/73 (04-17-23 @ 23:35) (165/91 - 188/86)  RR:  (16 - 18)  SpO2:  (95% - 99%)  Wt(kg): --  General: AAO x 3, appropriate mood and affect    Ophthalmology Exam:  Visual acuity (cc): 20/50 PH 20/30 OD, 20/20 OS  Pupils: PERRL OU, no APD  Ttono: 17 OU  Extraocular movements (EOMs): OD: full. OS: -1 ADduction deficit. trace abduction deficit OU, no pain, no diplopia  Confrontational Visual Field (CVF): Full OU  Color Plates: 12/12 OU    Pen Light Exam (PLE)  External: wnl OD, periorbital erythema and edema OS   Lids/Lashes/Lacrimal Ducts: Flat OD, 1-2+ upp and lower lid erythema and edema OS  Sclera/Conjunctiva: W+Q OD, mild chemosis with 1+ injection OS  Cornea: Cl OU  Anterior Chamber: D+F OU    Iris: Flat OU  Lens: NS OU    Fundus Exam: dilated with 1% tropicamide and 2.5% phenylephrine  Approval obtained from primary team for dilation  Patient aware that pupils can remained dilated for at least 4-6 hours  Exam performed with 20D lens    Vitreous: preretinal heme OD, wnl OS  Disc, cup/disc: sharp and pink, 0.4 OU  Macula: wnl OU  Vessels: wnl OU  Periphery: DBH OU    Labs/Imaging:    < from: CT Maxillofacial w/ IV Cont (04.17.23 @ 20:29) >  FINDINGS:    FACIAL BONES:  Normal.  MANDIBLE:  Normal.  VISUALIZED INTRACRANIAL STRUCTURES:  Normal.    ORBITAL CONTENTS:  There is a pocket of fluid measuring 2.2 x 0.9 cm in   maximal axial dimensions abutting an eroded left lamina papyracea in the   medial postseptal extraconal compartment, consistent with subperiosteal   abscess formation. Soft tissue infiltration and reactive thickening of   the medial and inferior recti with associated left-sided proptosis.    SINONASAL CAVITIES: Left-sided paranasal sinus disease in a ostiomeatal   unit pattern involving the maxillary and ethmoid sinuses.  Additionally, there is mucosal thickening in the right maxillary sinus   with aerosolized secretions.  VISUALIZED CERVICAL SPINE:   Disc degeneration is noted at C5-C6 and   C6-C7 with near-complete loss of disc height and disc osteophyte   confirmation.    Heterogeneous multinodular right thyroid lobe.    IMPRESSION:    Left orbital postseptal extraconal subperiosteal abscess associated with   left ethmoid sinusitis and dehiscent lamina papyracea. Reactive   thickening of the left medial and inferior recti with associated   proptosis.    < end of copied text >               [facility] DEPARTMENT OF OPHTHALMOLOGY - INITIAL ADULT CONSULT  -----------------------------------------------------------------------------------------------------------  Deyanira Jaramillo MD PGY 2  -----------------------------------------------------------------------------------------------------------    HPI Per ENT    62 yo M POD 7 s/p FESS. Patient had left eye swelling and redness post-operatively. Patient came to ED Friday and was found to have improving eye swelling. He was recommended CT but left ama. He returns today for CT scan. He state swelling is significantly improved. Denies pain with eye movement, no blurry vision, no vision changes, no headaches, no fever or chills.         Interval hx: history as above. Pt denies any new ocular complaints. Follows with Dr. Bell for PIPER. Has appointment with Dr. Bell on 4/28.     PAST MEDICAL & SURGICAL HISTORY:  H/O sinus surgery      Other specified diabetes mellitus        Past Ocular History: denies surg/laser  Ophthalmic Medications: none  FAMILY HISTORY:   no glc/amd  Social History: no etoh/tobacco    MEDICATIONS  (STANDING):    MEDICATIONS  (PRN):    Allergies & Intolerances:     Review of Systems:  Constitutional: No fever, chills  Eyes: No blurry vision, flashes, floaters, FBS, erythema, discharge, double vision, OU  Neuro: No tremors  Cardiovascular: No chest pain, palpitations  Respiratory: No SOB, no cough  GI: No nausea, vomiting, abdominal pain  : No dysuria  Skin: no rash  Psych: no depression  Endocrine: no polyuria, polydipsia  Heme/lymph: no swelling    VITALS: T(C): 37 (04-17-23 @ 23:35)  T(F): 98.6 (04-17-23 @ 23:35), Max: 98.6 (04-17-23 @ 23:35)  HR: 87 (04-17-23 @ 23:35) (79 - 90)  BP: 175/73 (04-17-23 @ 23:35) (165/91 - 188/86)  RR:  (16 - 18)  SpO2:  (95% - 99%)  Wt(kg): --  General: AAO x 3, appropriate mood and affect    Ophthalmology Exam:  Visual acuity (cc): 20/50 PH 20/30 OD, 20/20 OS  Pupils: PERRL OU, no APD  Ttono: 17 OU. Trace RTR temporally OS  Extraocular movements (EOMs): OD: full. OS: -1 ADduction deficit. trace abduction deficit OU, no pain, no diplopia  Confrontational Visual Field (CVF): Full OU  Color Plates: 12/12 OU    Pen Light Exam (PLE)  External: wnl OD, trace periorbital erythema and edema OS   Lids/Lashes/Lacrimal Ducts: Flat OD, 1-2+ upp and lower lid erythema and edema OS  Sclera/Conjunctiva: W+Q OD, mild chemosis with 1+ injection OS  Cornea: Cl OU  Anterior Chamber: D+F OU    Iris: Flat OU  Lens: NS OU    Fundus Exam: dilated with 1% tropicamide and 2.5% phenylephrine  Approval obtained from primary team for dilation  Patient aware that pupils can remained dilated for at least 4-6 hours  Exam performed with 20D lens    Vitreous: preretinal heme OD, wnl OS  Disc, cup/disc: sharp and pink, 0.4 OU  Macula: wnl OU  Vessels: wnl OU  Periphery: DBH OU    Labs/Imaging:    < from: CT Maxillofacial w/ IV Cont (04.17.23 @ 20:29) >  FINDINGS:    FACIAL BONES:  Normal.  MANDIBLE:  Normal.  VISUALIZED INTRACRANIAL STRUCTURES:  Normal.    ORBITAL CONTENTS:  There is a pocket of fluid measuring 2.2 x 0.9 cm in   maximal axial dimensions abutting an eroded left lamina papyracea in the   medial postseptal extraconal compartment, consistent with subperiosteal   abscess formation. Soft tissue infiltration and reactive thickening of   the medial and inferior recti with associated left-sided proptosis.    SINONASAL CAVITIES: Left-sided paranasal sinus disease in a ostiomeatal   unit pattern involving the maxillary and ethmoid sinuses.  Additionally, there is mucosal thickening in the right maxillary sinus   with aerosolized secretions.  VISUALIZED CERVICAL SPINE:   Disc degeneration is noted at C5-C6 and   C6-C7 with near-complete loss of disc height and disc osteophyte   confirmation.    Heterogeneous multinodular right thyroid lobe.    IMPRESSION:    Left orbital postseptal extraconal subperiosteal abscess associated with   left ethmoid sinusitis and dehiscent lamina papyracea. Reactive   thickening of the left medial and inferior recti with associated   proptosis.    < end of copied text >               [facility] DEPARTMENT OF OPHTHALMOLOGY - INITIAL ADULT CONSULT  -----------------------------------------------------------------------------------------------------------  Deyanira Jaramillo MD PGY 2  -----------------------------------------------------------------------------------------------------------    HPI Per ENT    64 yo M POD 7 s/p FESS. Patient had left eye swelling and redness post-operatively. Patient came to ED Friday and was found to have improving eye swelling. He was recommended CT but left ama. He returns today for CT scan. He state swelling is significantly improved. Denies pain with eye movement, no blurry vision, no vision changes, no headaches, no fever or chills.         Interval hx: history as above. Pt denies any new ocular complaints. Follows with Dr. Bell for PIPER. Has appointment with Dr. Bell on 4/28.     PAST MEDICAL & SURGICAL HISTORY:  H/O sinus surgery      Other specified diabetes mellitus        Past Ocular History: denies surg/laser  Ophthalmic Medications: none  FAMILY HISTORY:   no glc/amd  Social History: no etoh/tobacco    MEDICATIONS  (STANDING):    MEDICATIONS  (PRN):    Allergies & Intolerances:     Review of Systems:  Constitutional: No fever, chills  Eyes: No blurry vision, flashes, floaters, FBS, erythema, discharge, double vision, OU  Neuro: No tremors  Cardiovascular: No chest pain, palpitations  Respiratory: No SOB, no cough  GI: No nausea, vomiting, abdominal pain  : No dysuria  Skin: no rash  Psych: no depression  Endocrine: no polyuria, polydipsia  Heme/lymph: no swelling    VITALS: T(C): 37 (04-17-23 @ 23:35)  T(F): 98.6 (04-17-23 @ 23:35), Max: 98.6 (04-17-23 @ 23:35)  HR: 87 (04-17-23 @ 23:35) (79 - 90)  BP: 175/73 (04-17-23 @ 23:35) (165/91 - 188/86)  RR:  (16 - 18)  SpO2:  (95% - 99%)  Wt(kg): --  General: AAO x 3, appropriate mood and affect    Ophthalmology Exam:  Visual acuity (cc): 20/50 PH 20/30 OD, 20/20 OS  Pupils: PERRL OU, no APD  Ttono: 17 OU. Trace RTR temporally OS  Extraocular movements (EOMs): OD: full. OS: -1 ADduction deficit, -1 abduction deficit, -1 infraduction deficit OS. no pain, no diplopia  Confrontational Visual Field (CVF): Full OU  Color Plates: 12/12 OU    Pen Light Exam (PLE)  External: wnl OD, trace periorbital erythema and edema OS   Lids/Lashes/Lacrimal Ducts: Flat OD, 1-2+ upp and lower lid erythema and edema OS  Sclera/Conjunctiva: W+Q OD, mild chemosis with 1+ injection OS  Cornea: Cl OU  Anterior Chamber: D+F OU    Iris: Flat OU  Lens: NS OU    Fundus Exam: dilated with 1% tropicamide and 2.5% phenylephrine  Approval obtained from primary team for dilation  Patient aware that pupils can remained dilated for at least 4-6 hours  Exam performed with 20D lens    Vitreous: preretinal heme OD, wnl OS  Disc, cup/disc: sharp and pink, 0.4 OU  Macula: wnl OU  Vessels: wnl OU  Periphery: DBH OU    Labs/Imaging:    < from: CT Maxillofacial w/ IV Cont (04.17.23 @ 20:29) >  FINDINGS:    FACIAL BONES:  Normal.  MANDIBLE:  Normal.  VISUALIZED INTRACRANIAL STRUCTURES:  Normal.    ORBITAL CONTENTS:  There is a pocket of fluid measuring 2.2 x 0.9 cm in   maximal axial dimensions abutting an eroded left lamina papyracea in the   medial postseptal extraconal compartment, consistent with subperiosteal   abscess formation. Soft tissue infiltration and reactive thickening of   the medial and inferior recti with associated left-sided proptosis.    SINONASAL CAVITIES: Left-sided paranasal sinus disease in a ostiomeatal   unit pattern involving the maxillary and ethmoid sinuses.  Additionally, there is mucosal thickening in the right maxillary sinus   with aerosolized secretions.  VISUALIZED CERVICAL SPINE:   Disc degeneration is noted at C5-C6 and   C6-C7 with near-complete loss of disc height and disc osteophyte   confirmation.    Heterogeneous multinodular right thyroid lobe.    IMPRESSION:    Left orbital postseptal extraconal subperiosteal abscess associated with   left ethmoid sinusitis and dehiscent lamina papyracea. Reactive   thickening of the left medial and inferior recti with associated   proptosis.    < end of copied text >

## 2023-04-18 NOTE — CONSULT NOTE ADULT - ASSESSMENT
Assessment and Recommendations:  62y male w/ pmhx/ochx of DM, HTN, HLD, recent FESS complicated by post-op orbital swelling, found to have orbital collection on CT scan.     #Left Orbital Cellulitis   - Va 20/20, PERRL no APD, IOP 17, color plates 12/12, CVF full. No signs of optic nerve dysfunction  - EOM with trace ADduction deficit and ABduction deficit  - CT maxillofacial showing left orbital postseptal extraconal subperiosteal abscess associated with left ethmoid sinusitis and dehiscent lamina papyracea. Reactive   thickening of the left medial and inferior recti with associated proptosis.  - start preservative free artificial tears, one drop four times a day to left eye for comfort  - rest of care per ENT - Pt is currently NPO, ENT planning drainage likely tomorrow.     # diabetic retinopathy OU  - preretinal heme OD and scattered DBH OS  - followed by Dr. Bell, has appointment on 4/28  - BG control per primary team      Outpatient follow-up: Patient should follow-up with his/her ophthalmologist or with [facility] Department of Ophthalmology upon discharge at the address below     [address]  [place] [address]  [phone] Assessment and Recommendations:  62y male w/ pmhx/ochx of DM, HTN, HLD, recent FESS complicated by post-op orbital swelling, found to have orbital collection on CT scan.     #Left Orbital Abscess   - Va 20/20, PERRL no APD, IOP 17, color plates 12/12, CVF full. No signs of optic nerve dysfunction  - EOM with trace ADduction deficit and ABduction deficit  - Trace RTR temporally, able to rock globe  - CT maxillofacial showing left orbital postseptal extraconal subperiosteal abscess associated with left ethmoid sinusitis and dehiscent lamina papyracea. Reactive   thickening of the left medial and inferior recti with associated proptosis.  -Systemic abx per primary team  -Pt is currently NPO, ENT planning drainage likely tomorrow.   - start preservative free artificial tears, one drop four times a day to left eye for comfort    # diabetic retinopathy OU  - preretinal heme OD and scattered DBH OS  - followed by Dr. Bell, has appointment on 4/28  - BG control per primary team      Outpatient follow-up: Patient should follow-up with his/her ophthalmologist or with [facility] Department of Ophthalmology upon discharge at the address below     [address]  [place] [address]  [phone] Assessment and Recommendations:  62y male w/ pmhx/ochx of DM, HTN, HLD, recent FESS complicated by post-op orbital swelling, found to have orbital collection on CT scan.     # Left Orbital Abscess   - Va 20/20, PERRL no APD, IOP 17, color plates 12/12, CVF full. No signs of optic nerve dysfunction  - EOM with slight infra, abduct and adduction deficits of left eye  - Trace RTR temporally, able to rock globe  - CT maxillofacial showing left orbital postseptal extraconal subperiosteal abscess associated with left ethmoid sinusitis and dehiscent lamina papyracea. Reactive thickening of the left medial and inferior recti with associated proptosis.  - Systemic abx per primary team  - drainage per ENT  - start preservative free artificial tears, one drop four times a day to left eye for comfort    # diabetic retinopathy OU  - preretinal heme OD and scattered DBH OS  - followed by Dr. Bell, has appointment on 4/28  - BG control per primary team    Pt seen and discussed with Dr. Ramirez, attending.     Outpatient follow-up: Patient should follow-up with his/her ophthalmologist or with [facility] Department of Ophthalmology upon discharge at the address below     [address] [place] [address]. [address]  [place] [address]  [phone]

## 2023-04-18 NOTE — PATIENT PROFILE ADULT - DO YOU FEEL LIKE HURTING YOURSELF OR OTHERS?
----- Message from Sheree Ulloa sent at 2017  9:56 AM CST -----  Contact: Pt  Shannan Rebolledo  MRN: [id number]  : 1953  PCP: Gume Langley  Home Phone      [phone]  Work Phone      [phone]  Mobile          [phone]      MESSAGE:  Needs refill on Klonopin. Says she took her last one last night and is completely out. Please advise.  PHONE:  [phone]  
Requested Prescriptions     Pending Prescriptions Disp Refills    clonazePAM (KLONOPIN) 1 MG tablet 90 tablet 0     Si Tablet Oral At bedtime   LOV: 17. RTC: 17. Fax Rx to [facility]  
no

## 2023-04-18 NOTE — PATIENT PROFILE ADULT - FALL HARM RISK - UNIVERSAL INTERVENTIONS
Bed in lowest position, wheels locked, appropriate side rails in place/Call bell, personal items and telephone in reach/Instruct patient to call for assistance before getting out of bed or chair/Non-slip footwear when patient is out of bed/[place] to call system/Physically safe environment - no spills, clutter or unnecessary equipment/Purposeful Proactive Rounding/Room/bathroom lighting operational, light cord in reach

## 2023-04-19 ENCOUNTER — RESULT REVIEW (OUTPATIENT)
Age: 63
End: 2023-04-19

## 2023-04-19 ENCOUNTER — TRANSCRIPTION ENCOUNTER (OUTPATIENT)
Age: 63
End: 2023-04-19

## 2023-04-19 DIAGNOSIS — I10 ESSENTIAL (PRIMARY) HYPERTENSION: ICD-10-CM

## 2023-04-19 LAB
A1C WITH ESTIMATED AVERAGE GLUCOSE RESULT: 8.8 % — HIGH (ref 4–5.6)
ANION GAP SERPL CALC-SCNC: 18 MMOL/L — HIGH (ref 7–14)
BUN SERPL-MCNC: 15 MG/DL — SIGNIFICANT CHANGE UP (ref 7–23)
CALCIUM SERPL-MCNC: 9.3 MG/DL — SIGNIFICANT CHANGE UP (ref 8.4–10.5)
CHLORIDE SERPL-SCNC: 97 MMOL/L — LOW (ref 98–107)
CO2 SERPL-SCNC: 19 MMOL/L — LOW (ref 22–31)
CREAT SERPL-MCNC: 0.66 MG/DL — SIGNIFICANT CHANGE UP (ref 0.5–1.3)
EGFR: 106 ML/MIN/1.73M2 — SIGNIFICANT CHANGE UP
ESTIMATED AVERAGE GLUCOSE: 206 — SIGNIFICANT CHANGE UP
GLUCOSE BLDC GLUCOMTR-MCNC: 228 MG/DL — HIGH (ref 70–99)
GLUCOSE BLDC GLUCOMTR-MCNC: 247 MG/DL — HIGH (ref 70–99)
GLUCOSE BLDC GLUCOMTR-MCNC: 266 MG/DL — HIGH (ref 70–99)
GLUCOSE BLDC GLUCOMTR-MCNC: 293 MG/DL — HIGH (ref 70–99)
GLUCOSE BLDC GLUCOMTR-MCNC: 305 MG/DL — HIGH (ref 70–99)
GLUCOSE SERPL-MCNC: 233 MG/DL — HIGH (ref 70–99)
HCT VFR BLD CALC: 45.1 % — SIGNIFICANT CHANGE UP (ref 39–50)
HGB BLD-MCNC: 15.2 G/DL — SIGNIFICANT CHANGE UP (ref 13–17)
MAGNESIUM SERPL-MCNC: 2.1 MG/DL — SIGNIFICANT CHANGE UP (ref 1.6–2.6)
MCHC RBC-ENTMCNC: 29.8 PG — SIGNIFICANT CHANGE UP (ref 27–34)
MCHC RBC-ENTMCNC: 33.7 GM/DL — SIGNIFICANT CHANGE UP (ref 32–36)
MCV RBC AUTO: 88.4 FL — SIGNIFICANT CHANGE UP (ref 80–100)
NRBC # BLD: 0 /100 WBCS — SIGNIFICANT CHANGE UP (ref 0–0)
NRBC # FLD: 0 K/UL — SIGNIFICANT CHANGE UP (ref 0–0)
PHOSPHATE SERPL-MCNC: 4.3 MG/DL — SIGNIFICANT CHANGE UP (ref 2.5–4.5)
PLATELET # BLD AUTO: 317 K/UL — SIGNIFICANT CHANGE UP (ref 150–400)
POTASSIUM SERPL-MCNC: 3.8 MMOL/L — SIGNIFICANT CHANGE UP (ref 3.5–5.3)
POTASSIUM SERPL-SCNC: 3.8 MMOL/L — SIGNIFICANT CHANGE UP (ref 3.5–5.3)
RBC # BLD: 5.1 M/UL — SIGNIFICANT CHANGE UP (ref 4.2–5.8)
RBC # FLD: 12.7 % — SIGNIFICANT CHANGE UP (ref 10.3–14.5)
SODIUM SERPL-SCNC: 134 MMOL/L — LOW (ref 135–145)
VANCOMYCIN TROUGH SERPL-MCNC: 14.5 UG/ML — SIGNIFICANT CHANGE UP (ref 10–20)
WBC # BLD: 10.83 K/UL — HIGH (ref 3.8–10.5)
WBC # FLD AUTO: 10.83 K/UL — HIGH (ref 3.8–10.5)

## 2023-04-19 PROCEDURE — 31267 ENDOSCOPY MAXILLARY SINUS: CPT | Mod: LT,59

## 2023-04-19 PROCEDURE — 88311 DECALCIFY TISSUE: CPT | Mod: 26

## 2023-04-19 PROCEDURE — 99232 SBSQ HOSP IP/OBS MODERATE 35: CPT

## 2023-04-19 PROCEDURE — 61782 SCAN PROC CRANIAL EXTRA: CPT

## 2023-04-19 PROCEDURE — 88305 TISSUE EXAM BY PATHOLOGIST: CPT | Mod: 26

## 2023-04-19 PROCEDURE — 31237 NSL/SINS NDSC SURG BX POLYPC: CPT | Mod: RT,59

## 2023-04-19 PROCEDURE — 31292 NSL/SINS NDSC MED/INF DCMPRN: CPT | Mod: LT

## 2023-04-19 RX ORDER — INSULIN LISPRO 100/ML
VIAL (ML) SUBCUTANEOUS
Refills: 0 | Status: DISCONTINUED | OUTPATIENT
Start: 2023-04-19 | End: 2023-04-20

## 2023-04-19 RX ORDER — OXYCODONE HYDROCHLORIDE 5 MG/1
5 TABLET ORAL ONCE
Refills: 0 | Status: DISCONTINUED | OUTPATIENT
Start: 2023-04-19 | End: 2023-04-19

## 2023-04-19 RX ORDER — ISOPROPYL ALCOHOL, BENZOCAINE .7; .06 ML/ML; ML/ML
0 SWAB TOPICAL
Qty: 100 | Refills: 1
Start: 2023-04-19

## 2023-04-19 RX ORDER — ACETAMINOPHEN 500 MG
650 TABLET ORAL EVERY 6 HOURS
Refills: 0 | Status: DISCONTINUED | OUTPATIENT
Start: 2023-04-19 | End: 2023-04-20

## 2023-04-19 RX ORDER — INSULIN LISPRO 100/ML
VIAL (ML) SUBCUTANEOUS
Refills: 0 | Status: DISCONTINUED | OUTPATIENT
Start: 2023-04-19 | End: 2023-04-19

## 2023-04-19 RX ORDER — DEXTROSE 50 % IN WATER 50 %
15 SYRINGE (ML) INTRAVENOUS ONCE
Refills: 0 | Status: DISCONTINUED | OUTPATIENT
Start: 2023-04-19 | End: 2023-04-20

## 2023-04-19 RX ORDER — OXYCODONE HYDROCHLORIDE 5 MG/1
10 TABLET ORAL EVERY 6 HOURS
Refills: 0 | Status: DISCONTINUED | OUTPATIENT
Start: 2023-04-19 | End: 2023-04-20

## 2023-04-19 RX ORDER — OXYCODONE HYDROCHLORIDE 5 MG/1
5 TABLET ORAL EVERY 6 HOURS
Refills: 0 | Status: DISCONTINUED | OUTPATIENT
Start: 2023-04-19 | End: 2023-04-20

## 2023-04-19 RX ORDER — METOPROLOL TARTRATE 50 MG
50 TABLET ORAL
Refills: 0 | Status: DISCONTINUED | OUTPATIENT
Start: 2023-04-20 | End: 2023-04-20

## 2023-04-19 RX ORDER — AMLODIPINE BESYLATE 2.5 MG/1
5 TABLET ORAL DAILY
Refills: 0 | Status: DISCONTINUED | OUTPATIENT
Start: 2023-04-19 | End: 2023-04-19

## 2023-04-19 RX ORDER — SODIUM CHLORIDE 9 MG/ML
1000 INJECTION, SOLUTION INTRAVENOUS
Refills: 0 | Status: DISCONTINUED | OUTPATIENT
Start: 2023-04-19 | End: 2023-04-20

## 2023-04-19 RX ORDER — ONDANSETRON 8 MG/1
4 TABLET, FILM COATED ORAL ONCE
Refills: 0 | Status: DISCONTINUED | OUTPATIENT
Start: 2023-04-19 | End: 2023-04-19

## 2023-04-19 RX ORDER — HYDROMORPHONE HYDROCHLORIDE 2 MG/ML
0.5 INJECTION INTRAMUSCULAR; INTRAVENOUS; SUBCUTANEOUS
Refills: 0 | Status: DISCONTINUED | OUTPATIENT
Start: 2023-04-19 | End: 2023-04-19

## 2023-04-19 RX ORDER — DEXTROSE 50 % IN WATER 50 %
25 SYRINGE (ML) INTRAVENOUS ONCE
Refills: 0 | Status: DISCONTINUED | OUTPATIENT
Start: 2023-04-19 | End: 2023-04-20

## 2023-04-19 RX ORDER — DEXTROSE 50 % IN WATER 50 %
12.5 SYRINGE (ML) INTRAVENOUS ONCE
Refills: 0 | Status: DISCONTINUED | OUTPATIENT
Start: 2023-04-19 | End: 2023-04-20

## 2023-04-19 RX ORDER — INSULIN GLARGINE 100 [IU]/ML
7 INJECTION, SOLUTION SUBCUTANEOUS AT BEDTIME
Refills: 0 | Status: DISCONTINUED | OUTPATIENT
Start: 2023-04-19 | End: 2023-04-20

## 2023-04-19 RX ORDER — INSULIN LISPRO 100/ML
VIAL (ML) SUBCUTANEOUS EVERY 6 HOURS
Refills: 0 | Status: DISCONTINUED | OUTPATIENT
Start: 2023-04-19 | End: 2023-04-19

## 2023-04-19 RX ADMIN — Medication 3: at 19:57

## 2023-04-19 RX ADMIN — Medication 2: at 05:09

## 2023-04-19 RX ADMIN — SODIUM CHLORIDE 100 MILLILITER(S): 9 INJECTION, SOLUTION INTRAVENOUS at 20:00

## 2023-04-19 RX ADMIN — Medication 101.6 MILLIGRAM(S): at 05:04

## 2023-04-19 RX ADMIN — Medication 2: at 12:35

## 2023-04-19 RX ADMIN — Medication 250 MILLIGRAM(S): at 11:18

## 2023-04-19 RX ADMIN — Medication 100 MILLIGRAM(S): at 06:35

## 2023-04-19 RX ADMIN — Medication 1 DROP(S): at 05:09

## 2023-04-19 RX ADMIN — Medication 100 MILLIGRAM(S): at 23:00

## 2023-04-19 RX ADMIN — HYDROMORPHONE HYDROCHLORIDE 0.5 MILLIGRAM(S): 2 INJECTION INTRAMUSCULAR; INTRAVENOUS; SUBCUTANEOUS at 19:59

## 2023-04-19 RX ADMIN — HYDROMORPHONE HYDROCHLORIDE 0.5 MILLIGRAM(S): 2 INJECTION INTRAMUSCULAR; INTRAVENOUS; SUBCUTANEOUS at 20:24

## 2023-04-19 RX ADMIN — Medication 101.6 MILLIGRAM(S): at 13:47

## 2023-04-19 RX ADMIN — Medication 101.6 MILLIGRAM(S): at 20:28

## 2023-04-19 RX ADMIN — Medication 1 DROP(S): at 11:18

## 2023-04-19 RX ADMIN — Medication 100 MILLIGRAM(S): at 14:17

## 2023-04-19 RX ADMIN — Medication 250 MILLIGRAM(S): at 01:17

## 2023-04-19 RX ADMIN — AMLODIPINE BESYLATE 5 MILLIGRAM(S): 2.5 TABLET ORAL at 11:25

## 2023-04-19 RX ADMIN — Medication 4: at 22:59

## 2023-04-19 RX ADMIN — INSULIN GLARGINE 7 UNIT(S): 100 INJECTION, SOLUTION SUBCUTANEOUS at 23:00

## 2023-04-19 NOTE — DISCHARGE NOTE PROVIDER - CARE PROVIDER_API CALL
Akash Warner (MD)  Otolaryngology  [address], [address]st [place] [address]  Phone: [phone]  Fax: ()-  Follow Up Time:    Akash Warner)  Otolaryngology  [address], [address]st [place] [address]  Phone: [phone]  Fax: ()-  Follow Up Time:     Johny Pimentel)  EndocrinologyMetabDiabetes  [address] [place] [address]  Phone: [phone]  Fax: [phone]  Follow Up Time:

## 2023-04-19 NOTE — DISCHARGE NOTE PROVIDER - PROVIDER TOKENS
PROVIDER:[TOKEN:[[id number]]] PROVIDER:[TOKEN:[[id number]]],PROVIDER:[TOKEN:[[id number]]]

## 2023-04-19 NOTE — DISCHARGE NOTE PROVIDER - NSDCMRMEDTOKEN_GEN_ALL_CORE_FT
alcohol swabs: Apply topically to affected area 4 times a day  clindamycin 150 mg oral capsule: 3 cap(s) orally 3 times a day  glucometer (per patient&#x27;s insurance): Test blood sugars four times a day. Dispense #1 glucometer.  test strips (per patient&#x27;s insurance): 1 application subcutaneously 4 times a day. ** Compatible with patient&#x27;s glucometer **   alcohol swabs: Apply topically to affected area 4 times a day  Bactrim  mg-160 mg oral tablet: 1 tab(s) orally once a day  glucometer (per patient&#x27;s insurance): Test blood sugars four times a day. Dispense #1 glucometer.  predniSONE 10 mg oral tablet: 3 tab(s) orally once a day  test strips (per patient&#x27;s insurance): 1 application subcutaneously 4 times a day. ** Compatible with patient&#x27;s glucometer **   alcohol swabs: Apply topically to affected area 4 times a day  Bactrim  mg-160 mg oral tablet: 1 tab(s) orally once a day  Bactrim  mg-160 mg oral tablet: 1 tab(s) orally once a day  glucometer (per patient&#x27;s insurance): Test blood sugars four times a day. Dispense #1 glucometer.  Lantus Solostar Pen 100 units/mL subcutaneous solution: 15 unit(s) subcutaneous once (at bedtime)  metoprolol tartrate 50 mg oral tablet: 1 tab(s) orally 2 times a day  Ozempic 2 mg/1.5 mL (0.25 mg or 0.5 mg dose) subcutaneous solution: 0.25 milligram(s) subcutaneously once a week  predniSONE 10 mg oral tablet: 3 tab(s) orally once a day  test strips (per patient&#x27;s insurance): 1 application subcutaneously 4 times a day. ** Compatible with patient&#x27;s glucometer **   alcohol swabs: Apply topically to affected area 4 times a day  Bactrim  mg-160 mg oral tablet: 1 tab(s) orally once a day  glucometer (per patient&#x27;s insurance): Test blood sugars four times a day. Dispense #1 glucometer.  Lantus Solostar Pen 100 units/mL subcutaneous solution: 15 unit(s) subcutaneous once (at bedtime)  metoprolol tartrate 50 mg oral tablet: 1 tab(s) orally 2 times a day  Ozempic 2 mg/1.5 mL (0.25 mg or 0.5 mg dose) subcutaneous solution: 0.25 milligram(s) subcutaneously once a week  predniSONE 10 mg oral tablet: 3 tab(s) orally once a day  test strips (per patient&#x27;s insurance): 1 application subcutaneously 4 times a day. ** Compatible with patient&#x27;s glucometer **   alcohol swabs: Apply topically to affected area 4 times a day  Bactrim  mg-160 mg oral tablet: 1 tab(s) orally once a day  glucometer (per patient&#x27;s insurance): Test blood sugars four times a day. Dispense #1 glucometer.  metoprolol tartrate 50 mg oral tablet: 1 tab(s) orally 2 times a day  Ozempic 2 mg/1.5 mL (0.25 mg or 0.5 mg dose) subcutaneous solution: 0.25 milligram(s) subcutaneously once a week  predniSONE 10 mg oral tablet: 3 tab(s) orally once a day  Semglee (Prefilled Pen) 100 units/mL subcutaneous solution: 15 subcutaneous once (at bedtime)  test strips (per patient&#x27;s insurance): 1 application subcutaneously 4 times a day. ** Compatible with patient&#x27;s glucometer **

## 2023-04-19 NOTE — DISCHARGE NOTE PROVIDER - NSDCCPCAREPLAN_GEN_ALL_CORE_FT
PRINCIPAL DISCHARGE DIAGNOSIS  Diagnosis: Left orbital abscess  Assessment and Plan of Treatment:

## 2023-04-19 NOTE — DISCHARGE NOTE PROVIDER - NSDCFUADDAPPT_GEN_ALL_CORE_FT
You should follow up with Dr. Patel in one week.    You should follow up with the endocrinology service office in one week. the clinic number . Dr. Gonsalez number is listed

## 2023-04-19 NOTE — PROGRESS NOTE ADULT - ATTENDING COMMENTS
I reviewed the imaging and the treatment plan with the patient. Risks, benefits, and alternatives to operative intervention were discussed. Patient agrees to proceed to the operating room for drainage of SPA. Informed consent was signed. Proceed as planned.

## 2023-04-19 NOTE — PROGRESS NOTE ADULT - PROBLEM SELECTOR PLAN 1
- Planned for OR today for ESS with drainage subperiosteal abscess  - Abx: Flagyl, Vanc - monitor vanco trough - course of abx per primary team - send OR cx to help guide abx   - Decadron q8 hrs   - Ophtho consult: f/u  - out-pt [facility] and [facility] earlier this year normal - awaiting EKG - if ok, no objection to proceed to OR - Planned for OR today for ESS with drainage subperiosteal abscess  - Abx: Flagyl, Vanc - monitor vanco trough - course of abx per primary team - send OR cx to help guide abx   - Decadron q8 hrs per ENT  - Ophtho consult: f/u  - out-pt [facility] and [facility] earlier this year normal - awaiting EKG - if reassuring, no objection to proceed to OR

## 2023-04-19 NOTE — DISCHARGE NOTE PROVIDER - HOSPITAL COURSE
Patient S/P FESS, presented with left eye swelling, S/P SPA drainage. Evaluated by opthalmology and cleared for discharge home on 4/20/23. Patient S/P FESS, presented with left eye swelling, S/P SPA drainage. Medicine service was consulted and started patient on lantus 15 and  pre meal 15U. Medicine service recommended Endocrinology consult prior to discharge for A1C of 9. Endocrine was called and recommended patient to be discharged on...... Evaluated by opthalmology and cleared from their standpoint to be discharged and follow up out patient in one week. Cleared for discharge home by Dr. Patel with a prednisone taper and out patient follow up.

## 2023-04-19 NOTE — PROGRESS NOTE ADULT - PROBLEM SELECTOR PLAN 2
A1C 8.8  FS mid 200, has been npo p mn  - needs basal coverage for better control, start lanatus 7 units  hold home orals  will consider premeal Admelog if needed  cw NIss  monitor FS A1C 8.8  FS mid 200, has been npo p mn  - likely component of steroid induced hyperglycemia   - needs basal coverage for better control, start Lantus 7 units  hold home orals  will consider premeal Admelog if needed  cw NIss  monitor FS  needs out-pt follow up for DM2 considering uncontrolled A1C on 3 orals A1C 8.8  FS mid 200, has been npo p mn  - likely component of steroid induced hyperglycemia   - needs basal coverage for better control, start Lantus 7 units tonight once resumed on diet post-op   hold home orals  will consider premeal Admelog if needed once diet resumed and can assess post prandial FS   cw NIss  monitor FS  needs out-pt follow up for DM2 considering uncontrolled A1C on 3 orals

## 2023-04-19 NOTE — DISCHARGE NOTE PROVIDER - CARE PROVIDERS DIRECT ADDRESSES
,DirectAddress_Unknown ,DirectAddress_Unknown,[email or web address]

## 2023-04-20 ENCOUNTER — TRANSCRIPTION ENCOUNTER (OUTPATIENT)
Age: 63
End: 2023-04-20

## 2023-04-20 VITALS
RESPIRATION RATE: 18 BRPM | TEMPERATURE: 98 F | DIASTOLIC BLOOD PRESSURE: 76 MMHG | HEART RATE: 92 BPM | OXYGEN SATURATION: 94 % | SYSTOLIC BLOOD PRESSURE: 143 MMHG

## 2023-04-20 DIAGNOSIS — T38.0X5A ADVERSE EFFECT OF GLUCOCORTICOIDS AND SYNTHETIC ANALOGUES, INITIAL ENCOUNTER: ICD-10-CM

## 2023-04-20 DIAGNOSIS — E11.65 TYPE 2 DIABETES MELLITUS WITH HYPERGLYCEMIA: ICD-10-CM

## 2023-04-20 LAB
GLUCOSE BLDC GLUCOMTR-MCNC: 226 MG/DL — HIGH (ref 70–99)
GLUCOSE BLDC GLUCOMTR-MCNC: 282 MG/DL — HIGH (ref 70–99)
GLUCOSE BLDC GLUCOMTR-MCNC: 318 MG/DL — HIGH (ref 70–99)

## 2023-04-20 PROCEDURE — 99223 1ST HOSP IP/OBS HIGH 75: CPT

## 2023-04-20 PROCEDURE — 99232 SBSQ HOSP IP/OBS MODERATE 35: CPT

## 2023-04-20 RX ORDER — ISOPROPYL ALCOHOL, BENZOCAINE .7; .06 ML/ML; ML/ML
0 SWAB TOPICAL
Qty: 100 | Refills: 1
Start: 2023-04-20

## 2023-04-20 RX ORDER — METOPROLOL TARTRATE 50 MG
1 TABLET ORAL
Qty: 0 | Refills: 0 | DISCHARGE
Start: 2023-04-20

## 2023-04-20 RX ORDER — INSULIN GLARGINE 100 [IU]/ML
20 INJECTION, SOLUTION SUBCUTANEOUS AT BEDTIME
Refills: 0 | Status: DISCONTINUED | OUTPATIENT
Start: 2023-04-20 | End: 2023-04-20

## 2023-04-20 RX ORDER — INSULIN LISPRO 100/ML
VIAL (ML) SUBCUTANEOUS AT BEDTIME
Refills: 0 | Status: DISCONTINUED | OUTPATIENT
Start: 2023-04-20 | End: 2023-04-20

## 2023-04-20 RX ORDER — INSULIN LISPRO 100/ML
10 VIAL (ML) SUBCUTANEOUS
Refills: 0 | Status: DISCONTINUED | OUTPATIENT
Start: 2023-04-20 | End: 2023-04-20

## 2023-04-20 RX ORDER — INSULIN GLARGINE 100 [IU]/ML
15 INJECTION, SOLUTION SUBCUTANEOUS AT BEDTIME
Refills: 0 | Status: DISCONTINUED | OUTPATIENT
Start: 2023-04-20 | End: 2023-04-20

## 2023-04-20 RX ORDER — SEMAGLUTIDE 0.68 MG/ML
0.25 INJECTION, SOLUTION SUBCUTANEOUS
Qty: 1 | Refills: 1
Start: 2023-04-20

## 2023-04-20 RX ORDER — INSULIN GLARGINE 100 [IU]/ML
15 INJECTION, SOLUTION SUBCUTANEOUS
Qty: 1 | Refills: 0
Start: 2023-04-20 | End: 2023-04-28

## 2023-04-20 RX ORDER — INSULIN LISPRO 100/ML
5 VIAL (ML) SUBCUTANEOUS
Refills: 0 | Status: DISCONTINUED | OUTPATIENT
Start: 2023-04-20 | End: 2023-04-20

## 2023-04-20 RX ORDER — INSULIN GLARGINE 100 [IU]/ML
15 INJECTION, SOLUTION SUBCUTANEOUS
Qty: 9 | Refills: 0
Start: 2023-04-20 | End: 2023-04-28

## 2023-04-20 RX ORDER — INSULIN LISPRO 100/ML
VIAL (ML) SUBCUTANEOUS
Refills: 0 | Status: DISCONTINUED | OUTPATIENT
Start: 2023-04-20 | End: 2023-04-20

## 2023-04-20 RX ADMIN — Medication 250 MILLIGRAM(S): at 13:27

## 2023-04-20 RX ADMIN — Medication 1 DROP(S): at 00:01

## 2023-04-20 RX ADMIN — Medication 101.6 MILLIGRAM(S): at 12:06

## 2023-04-20 RX ADMIN — Medication 3: at 09:03

## 2023-04-20 RX ADMIN — Medication 4: at 12:29

## 2023-04-20 RX ADMIN — Medication 50 MILLIGRAM(S): at 05:11

## 2023-04-20 RX ADMIN — Medication 5 UNIT(S): at 12:28

## 2023-04-20 RX ADMIN — Medication 250 MILLIGRAM(S): at 00:01

## 2023-04-20 RX ADMIN — Medication 100 MILLIGRAM(S): at 06:03

## 2023-04-20 RX ADMIN — Medication 1 DROP(S): at 12:07

## 2023-04-20 RX ADMIN — Medication 101.6 MILLIGRAM(S): at 05:11

## 2023-04-20 NOTE — PROGRESS NOTE ADULT - PROBLEM SELECTOR PLAN 1
- S/P OR today w ESS with drainage subperiosteal abscess  - Abx: Flagyl, Vanc - monitor vanco trough - course of abx per primary team - f.u OR cx to help guide abx   - Decadron taper per ENT x 9 more days   - Ophtho f/u

## 2023-04-20 NOTE — PROGRESS NOTE ADULT - SUBJECTIVE AND OBJECTIVE BOX
[facility] DEPARTMENT OF OPHTHALMOLOGY  ------------------------------------------------------------------------------  Yajaira Reynoso MD PGY-3  ------------------------------------------------------------------------------    Interval History: No acute events overnight. Today patient denying blurred vision, eye pain, flashes, floaters, FBS, erythema, or discharge.     MEDICATIONS  (STANDING):  amLODIPine   Tablet 5 milliGRAM(s) Oral daily  artificial tears (preservative free) Ophthalmic Solution 1 Drop(s) Left EYE four times a day  dexAMETHasone  IVPB 8 milliGRAM(s) IV Intermittent every 8 hours  dextrose 5%. 1000 milliLiter(s) (50 mL/Hr) IV Continuous <Continuous>  dextrose 5%. 1000 milliLiter(s) (100 mL/Hr) IV Continuous <Continuous>  dextrose 50% Injectable 25 Gram(s) IV Push once  dextrose 50% Injectable 12.5 Gram(s) IV Push once  dextrose 50% Injectable 25 Gram(s) IV Push once  glucagon  Injectable 1 milliGRAM(s) IntraMuscular once  insulin lispro (ADMELOG) corrective regimen sliding scale   SubCutaneous every 6 hours  lactated ringers. 1000 milliLiter(s) (100 mL/Hr) IV Continuous <Continuous>  metroNIDAZOLE  IVPB 500 milliGRAM(s) IV Intermittent every 8 hours  vancomycin  IVPB 1750 milliGRAM(s) IV Intermittent every 12 hours    MEDICATIONS  (PRN):  dextrose Oral Gel 15 Gram(s) Oral once PRN Blood Glucose LESS THAN 70 milliGRAM(s)/deciliter      VITALS: T(C): 36.3 (04-19-23 @ 09:03)  T(F): 97.4 (04-19-23 @ 09:03), Max: 98.1 (04-18-23 @ 10:38)  HR: 80 (04-19-23 @ 09:03) (75 - 92)  BP: 139/70 (04-19-23 @ 09:03) (139/70 - 163/79)  RR:  (15 - 18)  SpO2:  (94% - 100%)  Wt(kg): --  General: AAOx3    Ophthalmology Exam:  Visual acuity (cc): 20/40 OD, 20/20 OS  Pupils: PERRL OU, no APD  Ttono: 20 OS  Extraocular movements (EOMs): OD: full. OS: trace abduction and adduction deficit OS. no pain, no diplopia    Pen Light Exam (PLE)  External: wnl OD, trace periorbital erythema and edema OS   Lids/Lashes/Lacrimal Ducts: Flat OD, 1-2+ upp and lower lid erythema and edema OS  Sclera/Conjunctiva: W+Q OD, mild chemosis with 1+ injection OS  Cornea: Cl OU  Anterior Chamber: D+F OU    Iris: Flat OU  Lens: NS OU  
OTOLARYNGOLOGY (ENT) PROGRESS NOTE    PATIENT: JONNY GAY  MRN: [id number]  : [birth year]  [id number]  DATE OF SERVICE:  23    Subjective/ Interval:   AFVSS. NAEON. No complaints this morning, eye appears unchanged.    ALLERGIES:  penicillins (Hives)      MEDICATIONS:  Antiinfectives:   metroNIDAZOLE  IVPB 500 milliGRAM(s) IV Intermittent every 8 hours  vancomycin  IVPB 1750 milliGRAM(s) IV Intermittent every 12 hours    IV fluids:  dextrose 5%. 1000 milliLiter(s) IV Continuous <Continuous>  dextrose 5%. 1000 milliLiter(s) IV Continuous <Continuous>  lactated ringers. 1000 milliLiter(s) IV Continuous <Continuous>    Hematologic/Anticoagulation:    Pain medications/Neuro:    Endocrine Medications:   dexAMETHasone  IVPB 8 milliGRAM(s) IV Intermittent every 8 hours  dextrose 50% Injectable 25 Gram(s) IV Push once  dextrose 50% Injectable 12.5 Gram(s) IV Push once  dextrose 50% Injectable 25 Gram(s) IV Push once  dextrose Oral Gel 15 Gram(s) Oral once PRN  glucagon  Injectable 1 milliGRAM(s) IntraMuscular once  insulin lispro (ADMELOG) corrective regimen sliding scale   SubCutaneous every 6 hours    All other standing medications:   amLODIPine   Tablet 5 milliGRAM(s) Oral daily  artificial tears (preservative free) Ophthalmic Solution 1 Drop(s) Left EYE four times a day    All other PRN medications:    Vital Signs Last 24 Hrs  T(C): 36.3 (2023 09:03), Max: 36.7 (2023 01:50)  T(F): 97.4 (2023 09:03), Max: 98.1 (2023 01:50)  HR: 80 (2023 09:03) (80 - 92)  BP: 139/70 (2023 09:03) (139/70 - 163/79)  BP(mean): --  RR: 18 (2023 09:03) (15 - 18)  SpO2: 98% (2023 09:03) (94% - 100%)    Parameters below as of 2023 09:03  Patient On (Oxygen Delivery Method): room air          - @ 07:01  -  - @ 07:00  --------------------------------------------------------  IN:    IV PiggyBack: 300 mL    Lactated Ringers: 1500 mL    Oral Fluid: 600 mL  Total IN: 2400 mL    OUT:    Voided (mL): 475 mL  Total OUT: 475 mL    Total NET: 1925 mL    PHYSICAL EXAM:  General: NAD, A+Ox3  No respiratory distress, stridor, or stertor  Voice quality: normal  Face:  Symmetric without masses or lesions  OU: PERRL, EOMI, no restricted movement. Left periorbital mild edema with conjunctival erythema  Nose: nasal cavity clear bilaterally, inferior turbinates normal, mucosa normal without crusting or bleeding  OC/OP: tongue normal, floor of mouth wnl, no masses or lesions, OP clear  Neck: soft/flat, no LAD     LABS                       15.2   10.83 )-----------( 317      ( 2023 05:42 )             45.1        134<L>  |  97<L>  |  15  ----------------------------<  233<H>  3.8   |  19<L>  |  0.66    Ca    9.3      2023 05:42  Phos  4.3       Mg     2.10         TPro  6.6  /  Alb  3.5  /  TBili  0.4  /  DBili  x   /  AST  18  /  ALT  15  /  AlkPhos  81  18         Coagulation Studies-   PT/INR - ( 2023 06:00 )   PT: 12.9 sec;   INR: 1.11 ratio         PTT - ( 2023 06:00 )  PTT:26.8 sec    Endocrine Panel-  Calcium, Total Serum: 9.3 mg/dL ( @ 05:42)    Imaging:  < from: CT Maxillofacial w/ IV Cont (23 @ 20:29) >  INTERPRETATION:  INDICATION:  Left periorbital erythema and swelling.   Pain on abduction of the left eye.    TECHNIQUE:  Axial thin sections images were obtained from the top of the   frontal sinuses through the bottom of the mandible following intravenous   contrast administration utilizing multislice helical technique.    Reformatted coronal and sagittal images were also obtained.90 cc's   Omnipaque 350 were administered. 10 cc's were discarded.    COMPARISON:  None.    FINDINGS:    FACIAL BONES:  Normal.  MANDIBLE:  Normal.  VISUALIZED INTRACRANIAL STRUCTURES:  Normal.    ORBITAL CONTENTS:  There is a pocket of fluid measuring 2.2 x 0.9 cm in   maximal axial dimensions abutting an eroded left lamina papyracea in the   medial postseptal extraconal compartment, consistent with subperiosteal   abscess formation. Soft tissue infiltration and reactive thickening of   the medial and inferior recti with associated left-sided proptosis.    SINONASAL CAVITIES: Left-sided paranasal sinus disease in a ostiomeatal   unit pattern involving the maxillary and ethmoid sinuses.  Additionally, there is mucosal thickening in the right maxillary sinus   with aerosolized secretions.  VISUALIZED CERVICAL SPINE:   Disc degeneration is noted at C5-C6 and   C6-C7 with near-complete loss of disc height and disc osteophyte   confirmation.    Heterogeneous multinodular right thyroid lobe.    IMPRESSION:    Left orbital postseptal extraconal subperiosteal abscess associated with   left ethmoid sinusitis and dehiscent lamina papyracea. Reactive   thickening of the left medial and inferior recti with associated   proptosis.    --- End of Report ---    < end of copied text >                
[facility] DEPARTMENT OF OPHTHALMOLOGY  ------------------------------------------------------------------------------  Yajaira Reynoso MD PGY-3  ------------------------------------------------------------------------------    Interval History: S/p revision L maxillary antrostomy, ethmoidectomy, sphenoid, with drainage of L SPA with ENT yesterday. Pt feels well today.     MEDICATIONS  (STANDING):  artificial tears (preservative free) Ophthalmic Solution 1 Drop(s) Left EYE four times a day  dexAMETHasone  IVPB 8 milliGRAM(s) IV Intermittent every 8 hours  dextrose 5%. 1000 milliLiter(s) (50 mL/Hr) IV Continuous <Continuous>  dextrose 5%. 1000 milliLiter(s) (100 mL/Hr) IV Continuous <Continuous>  dextrose 50% Injectable 25 Gram(s) IV Push once  dextrose 50% Injectable 12.5 Gram(s) IV Push once  dextrose 50% Injectable 25 Gram(s) IV Push once  glucagon  Injectable 1 milliGRAM(s) IntraMuscular once  insulin glargine Injectable (LANTUS) 15 Unit(s) SubCutaneous at bedtime  insulin lispro (ADMELOG) corrective regimen sliding scale   SubCutaneous Before meals and at bedtime  insulin lispro Injectable (ADMELOG) 5 Unit(s) SubCutaneous three times a day before meals  lactated ringers. 1000 milliLiter(s) (100 mL/Hr) IV Continuous <Continuous>  metoprolol tartrate 50 milliGRAM(s) Oral two times a day  metroNIDAZOLE  IVPB 500 milliGRAM(s) IV Intermittent every 8 hours  vancomycin  IVPB 1750 milliGRAM(s) IV Intermittent every 12 hours    MEDICATIONS  (PRN):  acetaminophen     Tablet .. 650 milliGRAM(s) Oral every 6 hours PRN Temp greater or equal to 38C (100.4F), Mild Pain (1 - 3)  dextrose Oral Gel 15 Gram(s) Oral once PRN Blood Glucose LESS THAN 70 milliGRAM(s)/deciliter  oxyCODONE    IR 5 milliGRAM(s) Oral every 6 hours PRN Moderate Pain (4 - 6)  oxyCODONE    IR 10 milliGRAM(s) Oral every 6 hours PRN Severe Pain (7 - 10)      VITALS: T(C): 36.6 (04-20-23 @ 10:00)  T(F): 97.8 (04-20-23 @ 10:00), Max: 98.2 (04-19-23 @ 13:52)  HR: 87 (04-20-23 @ 10:00) (73 - 114)  BP: 143/58 (04-20-23 @ 10:00) (120/40 - 193/77)  RR:  (11 - 21)  SpO2:  (92% - 100%)  Wt(kg): --  General: AAOx3    Ophthalmology Exam:  Visual acuity (cc): 20/20 OS  Pupils: PERRL OU, no APD  Extraocular movements (EOMs): Full OU    Pen Light Exam (PLE)  External: wnl OD, trace periorbital erythema and edema OS   Lids/Lashes/Lacrimal Ducts: Flat OD, trace upp and lower lid erythema and edema OS  Sclera/Conjunctiva: W+Q OD, mild chemosis with 1+ injection OS  Cornea: Cl OU  Anterior Chamber: D+F OU    Iris: Flat OU  Lens: NS OU  
OTOLARYNGOLOGY (ENT) PROGRESS NOTE    PATIENT: JONNY GAY  MRN: [id number]  : [birth year]  [id number]  DATE OF SERVICE:  23    Subjective/ Interval:   AFVSS. No fevers overnight. Has no complaints this morning.    ALLERGIES:  penicillins (Hives)      MEDICATIONS:  Antiinfectives:   metroNIDAZOLE  IVPB 500 milliGRAM(s) IV Intermittent every 8 hours  vancomycin  IVPB 1750 milliGRAM(s) IV Intermittent every 12 hours    IV fluids:  lactated ringers. 1000 milliLiter(s) IV Continuous <Continuous>    Hematologic/Anticoagulation:    Pain medications/Neuro:    Endocrine Medications:   dexAMETHasone  IVPB 8 milliGRAM(s) IV Intermittent every 8 hours    All other standing medications:   artificial tears (preservative free) Ophthalmic Solution 1 Drop(s) Left EYE four times a day    All other PRN medications:    Vital Signs Last 24 Hrs  T(C): 36.7 (2023 06:29), Max: 37 (2023 23:35)  T(F): 98.1 (2023 06:29), Max: 98.6 (2023 23:35)  HR: 72 (2023 06:29) (72 - 90)  BP: 146/58 (2023 06:29) (146/58 - 188/86)  BP(mean): --  RR: 18 (2023 06:29) (16 - 18)  SpO2: 95% (2023 06:29) (95% - 99%)    Parameters below as of 2023 06:29  Patient On (Oxygen Delivery Method): room air    PHYSICAL EXAM:  General: NAD, A+Ox3  No respiratory distress, stridor, or stertor  Voice quality: normal  Face:  Symmetric without masses or lesions  OU: PERRL, EOMI, no restricted movement. Left periorbital mild edema with conjunctival erythema  Nose: nasal cavity clear bilaterally, inferior turbinates normal, mucosa normal without crusting or bleeding  OC/OP: tongue normal, floor of mouth wnl, no masses or lesions, OP clear  Neck: soft/flat, no LAD     LABS                       14.2   9.65  )-----------( 304      ( 2023 22:07 )             41.9    -    138  |  103  |  17  ----------------------------<  180<H>  3.7   |  22  |  0.69    Ca    9.4      2023 22:07  Phos  3.6       Mg     1.90                Coagulation Studies-       Endocrine Panel-  Calcium, Total Serum: 9.4 mg/dL ( @ 22:07)  Calcium, Total Serum: 9.3 mg/dL ( @ 19:05)              
OTOLARYNGOLOGY (ENT) PROGRESS NOTE    PATIENT: JONNY GAY  MRN: [id number]  : [birth year]  [id number]  DATE OF SERVICE:  23    Subjective/ Interval:   AFVSS. WESLEYEON. Patient underwent revision L maxillary antrostomy, ethmoidectomy, sphenoid, with drainage of L SPA. No significant drainage or bleeding from nose. Breathing comfortably.    PAST MEDICAL & SURGICAL HISTORY:  H/O sinus surgery      Other specified diabetes mellitus      Diabetes mellitus        penicillins (Hives)    MEDICATIONS  (STANDING):  artificial tears (preservative free) Ophthalmic Solution 1 Drop(s) Left EYE four times a day  dexAMETHasone  IVPB 8 milliGRAM(s) IV Intermittent every 8 hours  dextrose 5%. 1000 milliLiter(s) (50 mL/Hr) IV Continuous <Continuous>  dextrose 5%. 1000 milliLiter(s) (100 mL/Hr) IV Continuous <Continuous>  dextrose 50% Injectable 25 Gram(s) IV Push once  dextrose 50% Injectable 12.5 Gram(s) IV Push once  dextrose 50% Injectable 25 Gram(s) IV Push once  glucagon  Injectable 1 milliGRAM(s) IntraMuscular once  insulin glargine Injectable (LANTUS) 7 Unit(s) SubCutaneous at bedtime  insulin lispro (ADMELOG) corrective regimen sliding scale   SubCutaneous Before meals and at bedtime  lactated ringers. 1000 milliLiter(s) (100 mL/Hr) IV Continuous <Continuous>  metoprolol tartrate 50 milliGRAM(s) Oral two times a day  metroNIDAZOLE  IVPB 500 milliGRAM(s) IV Intermittent every 8 hours  vancomycin  IVPB 1750 milliGRAM(s) IV Intermittent every 12 hours    MEDICATIONS  (PRN):  acetaminophen     Tablet .. 650 milliGRAM(s) Oral every 6 hours PRN Temp greater or equal to 38C (100.4F), Mild Pain (1 - 3)  dextrose Oral Gel 15 Gram(s) Oral once PRN Blood Glucose LESS THAN 70 milliGRAM(s)/deciliter  oxyCODONE    IR 5 milliGRAM(s) Oral every 6 hours PRN Moderate Pain (4 - 6)  oxyCODONE    IR 10 milliGRAM(s) Oral every 6 hours PRN Severe Pain (7 - 10)      Objective    ICU Vital Signs Last 24 Hrs  T(C): 36.6 (2023 01:50), Max: 36.8 (2023 13:52)  T(F): 97.8 (2023 01:50), Max: 98.2 (2023 13:52)  HR: 97 (2023 01:50) (73 - 114)  BP: 147/73 (2023 01:50) (120/40 - 193/77)  BP(mean): 83 (2023 20:30) (58 - 83)  ABP: --  ABP(mean): --  RR: 21 (2023 01:50) (11 - 21)  SpO2: 98% (2023 01:50) (93% - 100%)    O2 Parameters below as of :50  Patient On (Oxygen Delivery Method): room air        PHYSICAL EXAM:  General: NAD, A+Ox3  No respiratory distress, stridor, or stertor  Voice quality: normal  Face:  Symmetric without masses or lesions  OU: PERRL, EOMI, no restricted movement. Left periorbital mild edema with conjunctival erythema  Nose: No bleeding  OC/OP: tongue normal, floor of mouth wnl, no masses or lesions, OP clear  Neck: soft/flat, no LAD                            15.2   10.83 )-----------( 317      ( 2023 05:42 )             45.1         134<L>  |  97<L>  |  15  ----------------------------<  233<H>  3.8   |  19<L>  |  0.66    Ca    9.3      2023 05:42  Phos  4.3       Mg     2.10         TPro  6.6  /  Alb  3.5  /  TBili  0.4  /  DBili  x   /  AST  18  /  ALT  15  /  AlkPhos  81        I&O's Summary    2023 07:01  -  2023 07:00  --------------------------------------------------------  IN: 2400 mL / OUT: 475 mL / NET: 1925 mL    2023 07:01  -  2023 05:50  --------------------------------------------------------  IN: 1350 mL / OUT: 725 mL / NET: 625 mL             TECHNIQUE:  Axial thin sections images were obtained from the top of the   frontal sinuses through the bottom of the mandible following intravenous   contrast administration utilizing multislice helical technique.    Reformatted coronal and sagittal images were also obtained.90 cc's   Omnipaque 350 were administered. 10 cc's were discarded.    COMPARISON:  None.    FINDINGS:    FACIAL BONES:  Normal.  MANDIBLE:  Normal.  VISUALIZED INTRACRANIAL STRUCTURES:  Normal.    ORBITAL CONTENTS:  There is a pocket of fluid measuring 2.2 x 0.9 cm in   maximal axial dimensions abutting an eroded left lamina papyracea in the   medial postseptal extraconal compartment, consistent with subperiosteal   abscess formation. Soft tissue infiltration and reactive thickening of   the medial and inferior recti with associated left-sided proptosis.    SINONASAL CAVITIES: Left-sided paranasal sinus disease in a ostiomeatal   unit pattern involving the maxillary and ethmoid sinuses.  Additionally, there is mucosal thickening in the right maxillary sinus   with aerosolized secretions.  VISUALIZED CERVICAL SPINE:   Disc degeneration is noted at C5-C6 and   C6-C7 with near-complete loss of disc height and disc osteophyte   confirmation.    Heterogeneous multinodular right thyroid lobe.    IMPRESSION:    Left orbital postseptal extraconal subperiosteal abscess associated with   left ethmoid sinusitis and dehiscent lamina papyracea. Reactive   thickening of the left medial and inferior recti with associated   proptosis.    --- End of Report ---    < end of copied text >                
Patient is a 62y old  Male who presents with a chief complaint of Eye swelling (19 Apr 2023 10:56)      SUBJECTIVE / OVERNIGHT EVENTS: no events - eye unchanged     ROS:  No HA/DZ  No Vision changes   No CP, SOB  No N/V/D  No Edema  No Rash  NO weakness, numbness    MEDICATIONS  (STANDING):  artificial tears (preservative free) Ophthalmic Solution 1 Drop(s) Left EYE four times a day  dexAMETHasone  IVPB 8 milliGRAM(s) IV Intermittent every 8 hours  dextrose 5%. 1000 milliLiter(s) (50 mL/Hr) IV Continuous <Continuous>  dextrose 5%. 1000 milliLiter(s) (100 mL/Hr) IV Continuous <Continuous>  dextrose 50% Injectable 25 Gram(s) IV Push once  dextrose 50% Injectable 12.5 Gram(s) IV Push once  dextrose 50% Injectable 25 Gram(s) IV Push once  glucagon  Injectable 1 milliGRAM(s) IntraMuscular once  insulin lispro (ADMELOG) corrective regimen sliding scale   SubCutaneous every 6 hours  lactated ringers. 1000 milliLiter(s) (100 mL/Hr) IV Continuous <Continuous>  metroNIDAZOLE  IVPB 500 milliGRAM(s) IV Intermittent every 8 hours  vancomycin  IVPB 1750 milliGRAM(s) IV Intermittent every 12 hours    MEDICATIONS  (PRN):  dextrose Oral Gel 15 Gram(s) Oral once PRN Blood Glucose LESS THAN 70 milliGRAM(s)/deciliter      T(C): 36.3 (04-19-23 @ 09:03)  HR: 80 (04-19-23 @ 09:03)  BP: 139/70 (04-19-23 @ 09:03)  RR: 18 (04-19-23 @ 09:03)  SpO2: 98% (04-19-23 @ 09:03)  CAPILLARY BLOOD GLUCOSE      POCT Blood Glucose.: 247 mg/dL (19 Apr 2023 05:08)  POCT Blood Glucose.: 222 mg/dL (18 Apr 2023 21:30)  POCT Blood Glucose.: 292 mg/dL (18 Apr 2023 17:35)  POCT Blood Glucose.: 131 mg/dL (18 Apr 2023 13:27)    I&O's Summary    18 Apr 2023 07:01  -  19 Apr 2023 07:00  --------------------------------------------------------  IN: 2400 mL / OUT: 475 mL / NET: 1925 mL        PHYSICAL EXAM:  GENERAL: NAD, well-developed, AOx3  HEAD:  Atraumatic, Normocephalic  EYES: OU: PERRL, EOMI, no restricted movement. Left periorbital mild edema with conjunctival erythema  NECK: Supple, No JVD  CHEST/LUNG: Clear to auscultation bilaterally  HEART: Regular rate and rhythm; No murmurs, rubs, or gallops, No Edema  ABDOMEN: Soft, Nontender, Nondistended; Bowel sounds present  EXTREMITIES:  2+ Peripheral Pulses, No clubbing, cyanosis  PSYCH: No SI/HI  NEUROLOGY: non-focal  SKIN: No rashes or lesions    LABS:                        15.2   10.83 )-----------( 317      ( 19 Apr 2023 05:42 )             45.1     04-19    134<L>  |  97<L>  |  15  ----------------------------<  233<H>  3.8   |  19<L>  |  0.66    Ca    9.3      19 Apr 2023 05:42  Phos  4.3     04-19  Mg     2.10     04-19    TPro  6.6  /  Alb  3.5  /  TBili  0.4  /  DBili  x   /  AST  18  /  ALT  15  /  AlkPhos  81  04-18    PT/INR - ( 18 Apr 2023 06:00 )   PT: 12.9 sec;   INR: 1.11 ratio         PTT - ( 18 Apr 2023 06:00 )  PTT:26.8 sec              RADIOLOGY & ADDITIONAL TESTS:    Imaging Personally Reviewed:    Consultant(s) Notes Reviewed:      Care Discussed with Consultants/Other Providers:  
Patient is a 62y old  Male who presents with a chief complaint of Eye swelling (20 Apr 2023 05:49)      SUBJECTIVE / OVERNIGHT EVENTS: s/p OR, no events, FS running high     ROS:  No HA/DZ  No Vision changes   No CP, SOB  No N/V/D  No Edema  No Rash  NO weakness, numbness    MEDICATIONS  (STANDING):  artificial tears (preservative free) Ophthalmic Solution 1 Drop(s) Left EYE four times a day  dexAMETHasone  IVPB 8 milliGRAM(s) IV Intermittent every 8 hours  dextrose 5%. 1000 milliLiter(s) (50 mL/Hr) IV Continuous <Continuous>  dextrose 5%. 1000 milliLiter(s) (100 mL/Hr) IV Continuous <Continuous>  dextrose 50% Injectable 25 Gram(s) IV Push once  dextrose 50% Injectable 12.5 Gram(s) IV Push once  dextrose 50% Injectable 25 Gram(s) IV Push once  glucagon  Injectable 1 milliGRAM(s) IntraMuscular once  insulin glargine Injectable (LANTUS) 15 Unit(s) SubCutaneous at bedtime  insulin lispro (ADMELOG) corrective regimen sliding scale   SubCutaneous Before meals and at bedtime  insulin lispro Injectable (ADMELOG) 5 Unit(s) SubCutaneous three times a day before meals  lactated ringers. 1000 milliLiter(s) (100 mL/Hr) IV Continuous <Continuous>  metoprolol tartrate 50 milliGRAM(s) Oral two times a day  metroNIDAZOLE  IVPB 500 milliGRAM(s) IV Intermittent every 8 hours  vancomycin  IVPB 1750 milliGRAM(s) IV Intermittent every 12 hours    MEDICATIONS  (PRN):  acetaminophen     Tablet .. 650 milliGRAM(s) Oral every 6 hours PRN Temp greater or equal to 38C (100.4F), Mild Pain (1 - 3)  dextrose Oral Gel 15 Gram(s) Oral once PRN Blood Glucose LESS THAN 70 milliGRAM(s)/deciliter  oxyCODONE    IR 5 milliGRAM(s) Oral every 6 hours PRN Moderate Pain (4 - 6)  oxyCODONE    IR 10 milliGRAM(s) Oral every 6 hours PRN Severe Pain (7 - 10)      T(C): 36.6 (04-20-23 @ 10:00)  HR: 87 (04-20-23 @ 10:00)  BP: 143/58 (04-20-23 @ 10:00)  RR: 18 (04-20-23 @ 10:00)  SpO2: 92% (04-20-23 @ 10:00)  CAPILLARY BLOOD GLUCOSE      POCT Blood Glucose.: 282 mg/dL (20 Apr 2023 08:32)  POCT Blood Glucose.: 305 mg/dL (19 Apr 2023 22:58)  POCT Blood Glucose.: 293 mg/dL (19 Apr 2023 21:24)  POCT Blood Glucose.: 266 mg/dL (19 Apr 2023 19:51)  POCT Blood Glucose.: 226 mg/dL (19 Apr 2023 16:04)  POCT Blood Glucose.: 228 mg/dL (19 Apr 2023 12:22)    I&O's Summary    19 Apr 2023 07:01  -  20 Apr 2023 07:00  --------------------------------------------------------  IN: 2630 mL / OUT: 1725 mL / NET: 905 mL        PHYSICAL EXAM:  GENERAL: NAD, well-developed, AOx3  HEAD:  Atraumatic, Normocephalic  EYES: EOMI, PERRL, conjunctiva and sclera clear, improved erythema   NECK: Supple, No JVD  CHEST/LUNG: Clear to auscultation bilaterally  HEART: Regular rate and rhythm; No murmurs, rubs, or gallops, No Edema  ABDOMEN: Soft, Nontender, Nondistended; Bowel sounds present  EXTREMITIES:  2+ Peripheral Pulses, No clubbing, cyanosis  PSYCH: No SI/HI  NEUROLOGY: non-focal  SKIN: No rashes or lesions    LABS:                        15.2   10.83 )-----------( 317      ( 19 Apr 2023 05:42 )             45.1     04-19    134<L>  |  97<L>  |  15  ----------------------------<  233<H>  3.8   |  19<L>  |  0.66    Ca    9.3      19 Apr 2023 05:42  Phos  4.3     04-19  Mg     2.10     04-19              Blood Culture  Testing in progress        RADIOLOGY & ADDITIONAL TESTS:    Imaging Personally Reviewed:    Consultant(s) Notes Reviewed:      Care Discussed with Consultants/Other Providers:

## 2023-04-20 NOTE — DISCHARGE NOTE NURSING/CASE MANAGEMENT/SOCIAL WORK - [id number]_ALL_CORE
For information on Fall & Injury Prevention, visit: [email or web address] OR  [email or web address] OR  https://www.cdc.gov/steadi/patient.html

## 2023-04-20 NOTE — PROGRESS NOTE ADULT - ASSESSMENT
64 yo M POD 7 s/p FESS, developed left eye swelling on POD 2. NOw with significant improvement in eye swelling. No visual complaints. CT scan done, c/f SPA.    - IV abx  vanc/ flagyl  - Decadron q8 hrs   - Ophtho consult: f/u
64 yo M POD 7 s/p FESS, developed left eye swelling on POD 2. NOw with significant improvement in eye swelling. No visual complaints. CT scan done: c/f L orbital postseptal extraconal subperiosteal abscess ass w/ L ethmoid sinusitis and dehiscent lamina papyracea s/p revision L maxillary antrostomy, ethmoidectomy, sphenoid, with drainage of L SPA on 4/20.    - Opthal eval this AM  - Abx: Flagyl, Vanc  - Decadron q8 hrs   - Nasal rinses
64 yo M POD 7 s/p FESS, developed left eye swelling on POD 2. NOw with significant improvement in eye swelling. No visual complaints. CT scan done: c/f L orbital postseptal extraconal subperiosteal abscess ass w/ L ethmoid sinusitis and dehiscent lamina papyracea.    - Planned for OR today for ESS with drainage subperiosteal abscess  - Abx: Flagyl, Vanc  - Decadron q8 hrs   - Ophtho consult: f/u
Assessment and Recommendations:  62y male w/ pmhx/ochx of DM, HTN, HLD, recent FESS complicated by post-op orbital swelling, found to have orbital collection on CT scan.     # Left Orbital Abscess   - Va 20/20 No signs of optic nerve dysfunction  - EOM with trace abduct and adduction deficits of left eye  - Trace RTR temporally, able to rock globe  - CT maxillofacial showing left orbital postseptal extraconal subperiosteal abscess associated with left ethmoid sinusitis and dehiscent lamina papyracea. Reactive thickening of the left medial and inferior recti with associated proptosis.  - Systemic abx per primary team/ENT  - drainage per ENT  - c/w preservative free artificial tears, one drop four times a day to left eye for comfort    # diabetic retinopathy OU  - preretinal heme OD and scattered DBH OS  - followed by Dr. Bell, has appointment on 4/28  - BG control per primary team      Outpatient follow-up: Patient should follow-up with his/her ophthalmologist or with [facility] Department of Ophthalmology upon discharge at the address below     [address]  [place] [address]  [phone]
Assessment and Recommendations:  62y male w/ pmhx/ochx of DM, HTN, HLD, recent FESS complicated by post-op orbital swelling, found to have orbital collection on CT scan.     # Left SPA  - CT maxillofacial showing left orbital postseptal extraconal subperiosteal abscess associated with left ethmoid sinusitis and dehiscent lamina papyracea. Reactive thickening of the left medial and inferior recti with associated proptosis.  - s/p drainage by ENT on 4/19  - Va 20/20 No signs of optic nerve dysfunction  - EOM improved today - full OU  - Systemic abx per primary team/ENT  - c/w preservative free artificial tears, one drop four times a day to left eye for comfort    # diabetic retinopathy OU  - preretinal heme OD and scattered DBH OS  - followed by Dr. Bell, has appointment on 4/28  - BG control per primary team      Outpatient follow-up: Patient should follow-up with his/her ophthalmologist or with [facility] Department of Ophthalmology upon discharge at the address below     [address] [place] [address]. [address]  [place] [address]  [phone]

## 2023-04-20 NOTE — DISCHARGE NOTE NURSING/CASE MANAGEMENT/SOCIAL WORK - PATIENT PORTAL LINK FT
You can access the FollowMyHealth Patient Portal offered by [facility] by registering at the following website: [email or web address]. By joining [employer]’s FollowMyHealth portal, you will also be able to view your health information using other applications (apps) compatible with our system.

## 2023-04-20 NOTE — CONSULT NOTE ADULT - ASSESSMENT
62M DM2 uncontrolled with hyperglycemia now exacerbated by glucocorticoid in setting of recent maxillary surgery for abscess. HbA1c 8.8%.    1) DM2 with hyperglycemia   62M DM2 uncontrolled with hyperglycemia now exacerbated by glucocorticoid in setting of recent maxillary surgery for abscess. HbA1c 8.8%.    1) DM2 with hyperglycemia  While inpatient:  Increase Lantus to 20 units qhs  Increase Admelog to 10/10/10 units premeal  Increase to moderate scale premeal and moderate bedtime scale  FS premeal and bedtime  BG target 100-180 mg/dl  carb consistent diet  RN to perform insulin pen teaching using toolbox with practice pens - RN notified.  Reviewed importance of glycemic control to prevent future infections, help with wound healing of current post op state and prevent other DM complications.    2) Steroid use - currently on dexamethasone 8mg IV q8h  ENT to please clarify whether patient is to be discharged on a steroid regimen and if so what med, what dose and for how long.  This would enable endocrine to provide a discharge plan.    If dc on steroid will plan to add basal insulin pen at discharge (Lantus, Basaglar, Tresiba etc, as per insurance) - dose depending on above steroid info.   Will need script for BD pen needles  glucometer, test strips, lancets    Would also check coverage for adding once weekly GLP1RA - check coverage for Trulicity 0.75 mg SQ once weekly or Ozempic 0.25 mg SQ once weekly.      When off steroid: patient would use once weekly GLP1 + resume 3 home oral agents   Home DM meds: metformin 1000mg BID, glimepiride 4mg daily, Jardiance 25mg daily.    For time on steroid only will add basal insulin pen as above.    Endocrine follow up appointments to be scheduled at [address] [place] [address] [address], [phone] - office contacted to call patient for appointments.    3) HTN  continue metprolol  BP goal < 130/80    Endocrine team consulted for uncontrolled diabetes. Patient is high risk with high level decision making due to uncontrolled diabetes which places patient at high risk for cardiovascular and cerebrovascular events. Patient with lability of glucose requiring close monitoring and insulin adjustments.    Johny Saunders MD  Division of Endocrinology  Pager: [phone]    If after 6PM or before 9AM, or on weekends/holidays, please call endocrine answering service for assistance ([phone]).  For nonurgent matters email [email or web address] for assistance.       62M DM2 uncontrolled with hyperglycemia now exacerbated by glucocorticoid in setting of recent maxillary surgery for abscess. HbA1c 8.8%.    1) DM2 with hyperglycemia  While inpatient:  Increase Lantus to 20 units qhs  Increase Admelog to 10/10/10 units premeal  Increase to moderate scale premeal and moderate bedtime scale  FS premeal and bedtime  BG target 100-180 mg/dl  carb consistent diet  RN to perform insulin pen teaching using toolbox with practice pens - RN notified.  Reviewed importance of glycemic control to prevent future infections, help with wound healing of current post op state and prevent other DM complications.    2) Steroid use - currently on dexamethasone 8mg IV q8h  ENT to please clarify whether patient is to be discharged on a steroid regimen and if so what med, what dose and for how long.  This would enable endocrine to provide a discharge plan.    If dc on steroid will plan to add basal insulin pen at discharge (Lantus, Basaglar, Tresiba etc, as per insurance) - dose depending on above steroid info.   Will need script for BD pen needles  glucometer, test strips, lancets    Would also check coverage for adding once weekly GLP1RA - check coverage for Trulicity 0.75 mg SQ once weekly or Ozempic 0.25 mg SQ once weekly.    Addendum: steroid dc plan per ENT is prednisone 30mg daily for 3d then 20mg for 3d then 10mg for 3d then stop.  Can prescribe Lantus 15 units qhs (or equivalent long acting insulin pen) for time on prednisone, stop once prednisone is finished.  Continue 3 home meds and add GLP1 also during this time period.    When off steroid: patient would use once weekly GLP1 + resume 3 home oral agents   Home DM meds: metformin 1000mg BID, glimepiride 4mg daily, Jardiance 25mg daily.    For time on steroid only will add basal insulin pen as above.    Endocrine follow up appointments to be scheduled at [address] [place] [address] [address], [phone] - office contacted to call patient for appointments.    3) HTN  continue metprolol  BP goal < 130/80    Plan discussed with Dr. Francois and ELIZABETH Saldana    Endocrine team consulted for uncontrolled diabetes. Patient is high risk with high level decision making due to uncontrolled diabetes which places patient at high risk for cardiovascular and cerebrovascular events. Patient with lability of glucose requiring close monitoring and insulin adjustments.    Johny Saunders MD  Division of Endocrinology  Pager: [phone]    If after 6PM or before 9AM, or on weekends/holidays, please call endocrine answering service for assistance ([phone]).  For nonurgent matters email [email or web address] for assistance.

## 2023-04-20 NOTE — PROGRESS NOTE ADULT - PROBLEM SELECTOR PLAN 3
resumed home BB  monitor BP
resume home BB  gave dose Norvasc this AM, switched to BB for jerome  monitor BP
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
## 2023-04-21 LAB
CULTURE RESULTS: SIGNIFICANT CHANGE UP
CULTURE RESULTS: SIGNIFICANT CHANGE UP
SPECIMEN SOURCE: SIGNIFICANT CHANGE UP
SPECIMEN SOURCE: SIGNIFICANT CHANGE UP

## 2023-04-28 ENCOUNTER — OFFICE (OUTPATIENT)
Dept: URBAN - METROPOLITAN AREA CLINIC 105 | Facility: CLINIC | Age: 63
Setting detail: OPHTHALMOLOGY
End: 2023-04-28
Payer: COMMERCIAL

## 2023-04-28 DIAGNOSIS — E11.3512: ICD-10-CM

## 2023-04-28 DIAGNOSIS — E11.3513: ICD-10-CM

## 2023-04-28 DIAGNOSIS — E11.3511: ICD-10-CM

## 2023-04-28 PROCEDURE — 67028 INJECTION EYE DRUG: CPT | Performed by: OPHTHALMOLOGY

## 2023-04-28 PROCEDURE — 92134 CPTRZ OPH DX IMG PST SGM RTA: CPT | Performed by: OPHTHALMOLOGY

## 2023-04-28 PROCEDURE — 99024 POSTOP FOLLOW-UP VISIT: CPT | Performed by: OPHTHALMOLOGY

## 2023-04-28 ASSESSMENT — SPHEQUIV_DERIVED
OD_SPHEQUIV: 3
OS_SPHEQUIV: 0.125

## 2023-04-28 ASSESSMENT — VISUAL ACUITY
OS_BCVA: 20/150
OD_BCVA: 20/25

## 2023-04-28 ASSESSMENT — REFRACTION_AUTOREFRACTION
OS_SPHERE: +0.50
OS_CYLINDER: -0.75
OS_AXIS: 082
OD_SPHERE: +3.25
OD_AXIS: 105
OD_CYLINDER: -0.50

## 2023-05-01 LAB — SURGICAL PATHOLOGY STUDY: SIGNIFICANT CHANGE UP

## 2023-05-08 ENCOUNTER — OFFICE (OUTPATIENT)
Dept: URBAN - METROPOLITAN AREA CLINIC 105 | Facility: CLINIC | Age: 63
Setting detail: OPHTHALMOLOGY
End: 2023-05-08
Payer: COMMERCIAL

## 2023-05-08 DIAGNOSIS — E11.3512: ICD-10-CM

## 2023-05-08 PROCEDURE — 67210 TREATMENT OF RETINAL LESION: CPT | Performed by: OPHTHALMOLOGY

## 2023-05-08 ASSESSMENT — CONFRONTATIONAL VISUAL FIELD TEST (CVF)
OS_FINDINGS: FULL
OD_FINDINGS: FULL

## 2023-05-08 ASSESSMENT — SPHEQUIV_DERIVED
OS_SPHEQUIV: 0.125
OD_SPHEQUIV: 3

## 2023-05-08 ASSESSMENT — REFRACTION_AUTOREFRACTION
OD_SPHERE: +3.25
OS_AXIS: 082
OD_AXIS: 105
OS_SPHERE: +0.50
OS_CYLINDER: -0.75
OD_CYLINDER: -0.50

## 2023-05-08 ASSESSMENT — VISUAL ACUITY
OS_BCVA: 20/200
OD_BCVA: 20/25+2

## 2023-05-17 ENCOUNTER — OFFICE (OUTPATIENT)
Dept: URBAN - METROPOLITAN AREA CLINIC 105 | Facility: CLINIC | Age: 63
Setting detail: OPHTHALMOLOGY
End: 2023-05-17
Payer: COMMERCIAL

## 2023-05-17 DIAGNOSIS — E11.3511: ICD-10-CM

## 2023-05-17 DIAGNOSIS — E11.3513: ICD-10-CM

## 2023-05-17 PROCEDURE — 67210 TREATMENT OF RETINAL LESION: CPT | Performed by: OPHTHALMOLOGY

## 2023-05-17 PROCEDURE — 92134 CPTRZ OPH DX IMG PST SGM RTA: CPT | Performed by: OPHTHALMOLOGY

## 2023-05-17 ASSESSMENT — REFRACTION_AUTOREFRACTION
OD_SPHERE: +3.25
OS_AXIS: 082
OD_AXIS: 105
OD_CYLINDER: -0.50
OS_SPHERE: +0.50
OS_CYLINDER: -0.75

## 2023-05-17 ASSESSMENT — VISUAL ACUITY
OS_BCVA: 20/400
OD_BCVA: 20/20-2

## 2023-05-17 ASSESSMENT — CONFRONTATIONAL VISUAL FIELD TEST (CVF)
OD_FINDINGS: FULL
OS_FINDINGS: FULL

## 2023-05-17 ASSESSMENT — SPHEQUIV_DERIVED
OD_SPHEQUIV: 3
OS_SPHEQUIV: 0.125

## 2023-05-25 ENCOUNTER — APPOINTMENT (OUTPATIENT)
Dept: ENDOCRINOLOGY | Facility: CLINIC | Age: 63
End: 2023-05-25

## 2023-05-26 ENCOUNTER — OFFICE (OUTPATIENT)
Dept: URBAN - METROPOLITAN AREA CLINIC 105 | Facility: CLINIC | Age: 63
Setting detail: OPHTHALMOLOGY
End: 2023-05-26
Payer: COMMERCIAL

## 2023-05-26 DIAGNOSIS — E11.3512: ICD-10-CM

## 2023-05-26 PROCEDURE — 67028 INJECTION EYE DRUG: CPT | Performed by: OPHTHALMOLOGY

## 2023-05-26 PROCEDURE — 92134 CPTRZ OPH DX IMG PST SGM RTA: CPT | Performed by: OPHTHALMOLOGY

## 2023-05-26 ASSESSMENT — REFRACTION_AUTOREFRACTION
OS_CYLINDER: -0.75
OS_AXIS: 082
OD_AXIS: 105
OD_CYLINDER: -0.50
OS_SPHERE: +0.50
OD_SPHERE: +3.25

## 2023-05-26 ASSESSMENT — VISUAL ACUITY
OS_BCVA: 20/150-1
OD_BCVA: 20/20-1

## 2023-05-26 ASSESSMENT — SPHEQUIV_DERIVED
OD_SPHEQUIV: 3
OS_SPHEQUIV: 0.125

## 2023-05-26 ASSESSMENT — CONFRONTATIONAL VISUAL FIELD TEST (CVF)
OD_FINDINGS: FULL
OS_FINDINGS: FULL

## 2023-06-12 ENCOUNTER — OFFICE (OUTPATIENT)
Dept: URBAN - METROPOLITAN AREA CLINIC 105 | Facility: CLINIC | Age: 63
Setting detail: OPHTHALMOLOGY
End: 2023-06-12
Payer: COMMERCIAL

## 2023-06-12 DIAGNOSIS — E11.3511: ICD-10-CM

## 2023-06-12 DIAGNOSIS — E11.3512: ICD-10-CM

## 2023-06-12 PROCEDURE — 92134 CPTRZ OPH DX IMG PST SGM RTA: CPT | Performed by: OPHTHALMOLOGY

## 2023-06-12 PROCEDURE — 67028 INJECTION EYE DRUG: CPT | Performed by: OPHTHALMOLOGY

## 2023-06-12 ASSESSMENT — TONOMETRY: OD_IOP_MMHG: 21

## 2023-06-12 ASSESSMENT — VISUAL ACUITY
OS_BCVA: 20/150
OD_BCVA: 20/20-1

## 2023-06-12 ASSESSMENT — REFRACTION_AUTOREFRACTION
OD_SPHERE: +3.25
OS_AXIS: 082
OD_AXIS: 105
OS_SPHERE: +0.50
OD_CYLINDER: -0.50
OS_CYLINDER: -0.75

## 2023-06-12 ASSESSMENT — SPHEQUIV_DERIVED
OD_SPHEQUIV: 3
OS_SPHEQUIV: 0.125

## 2023-06-12 ASSESSMENT — CONFRONTATIONAL VISUAL FIELD TEST (CVF)
OD_FINDINGS: FULL
OS_FINDINGS: FULL

## 2023-09-13 ENCOUNTER — APPOINTMENT (OUTPATIENT)
Dept: ENDOCRINOLOGY | Facility: CLINIC | Age: 63
End: 2023-09-13

## 2023-10-18 ENCOUNTER — OFFICE (OUTPATIENT)
Dept: URBAN - METROPOLITAN AREA CLINIC 105 | Facility: CLINIC | Age: 63
Setting detail: OPHTHALMOLOGY
End: 2023-10-18
Payer: COMMERCIAL

## 2023-10-18 DIAGNOSIS — E11.3513: ICD-10-CM

## 2023-10-18 DIAGNOSIS — E11.3511: ICD-10-CM

## 2023-10-18 PROBLEM — H25.1311 CATARACT; BOTH EYES: Status: ACTIVE | Noted: 2023-10-18

## 2023-10-18 PROCEDURE — 67210 TREATMENT OF RETINAL LESION: CPT | Mod: RT | Performed by: OPHTHALMOLOGY

## 2023-10-18 PROCEDURE — 92014 COMPRE OPH EXAM EST PT 1/>: CPT | Performed by: OPHTHALMOLOGY

## 2023-10-18 ASSESSMENT — VISUAL ACUITY
OD_BCVA: 20/30+
OS_BCVA: 20/200

## 2023-10-18 ASSESSMENT — CONFRONTATIONAL VISUAL FIELD TEST (CVF)
OD_FINDINGS: FULL
OS_FINDINGS: FULL

## 2023-10-18 ASSESSMENT — TONOMETRY: OD_IOP_MMHG: 19

## 2023-10-18 ASSESSMENT — SPHEQUIV_DERIVED
OS_SPHEQUIV: 0.125
OD_SPHEQUIV: 3

## 2023-10-18 ASSESSMENT — REFRACTION_AUTOREFRACTION
OS_AXIS: 082
OD_SPHERE: +3.25
OD_AXIS: 105
OS_SPHERE: +0.50
OS_CYLINDER: -0.75
OD_CYLINDER: -0.50

## 2023-11-15 ENCOUNTER — OFFICE (OUTPATIENT)
Dept: URBAN - METROPOLITAN AREA CLINIC 105 | Facility: CLINIC | Age: 63
Setting detail: OPHTHALMOLOGY
End: 2023-11-15
Payer: COMMERCIAL

## 2023-11-15 DIAGNOSIS — E11.3512: ICD-10-CM

## 2023-11-15 PROCEDURE — 67210 TREATMENT OF RETINAL LESION: CPT | Mod: 79,LT | Performed by: OPHTHALMOLOGY

## 2023-11-15 ASSESSMENT — REFRACTION_AUTOREFRACTION
OS_CYLINDER: -0.75
OD_AXIS: 105
OD_SPHERE: +3.25
OS_SPHERE: +0.50
OD_CYLINDER: -0.50
OS_AXIS: 082

## 2023-11-15 ASSESSMENT — CONFRONTATIONAL VISUAL FIELD TEST (CVF)
OD_FINDINGS: FULL
OS_FINDINGS: FULL

## 2023-11-15 ASSESSMENT — SPHEQUIV_DERIVED
OS_SPHEQUIV: 0.125
OD_SPHEQUIV: 3

## 2023-11-27 ENCOUNTER — OFFICE (OUTPATIENT)
Dept: URBAN - METROPOLITAN AREA CLINIC 105 | Facility: CLINIC | Age: 63
Setting detail: OPHTHALMOLOGY
End: 2023-11-27
Payer: COMMERCIAL

## 2023-11-27 DIAGNOSIS — E11.3513: ICD-10-CM

## 2023-11-27 PROBLEM — E11.3512 DM TYPE 2; RIGHT PROLIFERATIVE WITH ME, LEFT PROLIFERATIVE WITH ME: Status: ACTIVE | Noted: 2023-11-27

## 2023-11-27 PROBLEM — E11.3511 DM TYPE 2; RIGHT PROLIFERATIVE WITH ME, LEFT PROLIFERATIVE WITH ME: Status: ACTIVE | Noted: 2023-11-27

## 2023-11-27 PROCEDURE — 92134 CPTRZ OPH DX IMG PST SGM RTA: CPT | Performed by: OPHTHALMOLOGY

## 2023-11-27 PROCEDURE — 99024 POSTOP FOLLOW-UP VISIT: CPT | Performed by: OPHTHALMOLOGY

## 2023-11-27 PROCEDURE — 67028 INJECTION EYE DRUG: CPT | Mod: 78,50 | Performed by: OPHTHALMOLOGY

## 2023-11-27 ASSESSMENT — REFRACTION_AUTOREFRACTION
OS_CYLINDER: -0.75
OD_SPHERE: +3.25
OS_AXIS: 082
OD_AXIS: 105
OS_SPHERE: +0.50
OD_CYLINDER: -0.50

## 2023-11-27 ASSESSMENT — SPHEQUIV_DERIVED
OS_SPHEQUIV: 0.125
OD_SPHEQUIV: 3

## 2023-11-27 ASSESSMENT — CONFRONTATIONAL VISUAL FIELD TEST (CVF)
OS_FINDINGS: FULL
OD_FINDINGS: FULL

## 2024-01-17 ENCOUNTER — OFFICE (OUTPATIENT)
Dept: URBAN - METROPOLITAN AREA CLINIC 105 | Facility: CLINIC | Age: 64
Setting detail: OPHTHALMOLOGY
End: 2024-01-17
Payer: COMMERCIAL

## 2024-01-17 DIAGNOSIS — E11.3511: ICD-10-CM

## 2024-01-17 DIAGNOSIS — E11.3512: ICD-10-CM

## 2024-01-17 DIAGNOSIS — E11.3513: ICD-10-CM

## 2024-01-17 PROCEDURE — 92134 CPTRZ OPH DX IMG PST SGM RTA: CPT | Performed by: OPHTHALMOLOGY

## 2024-01-17 PROCEDURE — 67028 INJECTION EYE DRUG: CPT | Mod: 79,RT | Performed by: OPHTHALMOLOGY

## 2024-01-17 PROCEDURE — 99024 POSTOP FOLLOW-UP VISIT: CPT | Performed by: OPHTHALMOLOGY

## 2024-01-17 ASSESSMENT — SPHEQUIV_DERIVED
OS_SPHEQUIV: 0.125
OD_SPHEQUIV: 3

## 2024-01-17 ASSESSMENT — REFRACTION_AUTOREFRACTION
OD_CYLINDER: -0.50
OS_CYLINDER: -0.75
OD_AXIS: 105
OS_AXIS: 082
OD_SPHERE: +3.25
OS_SPHERE: +0.50

## 2024-01-17 ASSESSMENT — CONFRONTATIONAL VISUAL FIELD TEST (CVF)
OS_FINDINGS: FULL
OD_FINDINGS: FULL

## 2024-01-29 ENCOUNTER — OFFICE (OUTPATIENT)
Dept: URBAN - METROPOLITAN AREA CLINIC 88 | Facility: CLINIC | Age: 64
Setting detail: OPHTHALMOLOGY
End: 2024-01-29
Payer: COMMERCIAL

## 2024-01-29 DIAGNOSIS — E11.3512: ICD-10-CM

## 2024-01-29 DIAGNOSIS — E11.3513: ICD-10-CM

## 2024-01-29 PROCEDURE — 92134 CPTRZ OPH DX IMG PST SGM RTA: CPT | Performed by: OPHTHALMOLOGY

## 2024-01-29 PROCEDURE — 67028 INJECTION EYE DRUG: CPT | Mod: 58,LT | Performed by: OPHTHALMOLOGY

## 2024-01-29 ASSESSMENT — REFRACTION_AUTOREFRACTION
OD_CYLINDER: -1.25
OS_CYLINDER: -0.50
OS_AXIS: 178
OS_SPHERE: +1.00
OD_SPHERE: +4.50
OD_AXIS: 101

## 2024-01-29 ASSESSMENT — SPHEQUIV_DERIVED
OS_SPHEQUIV: 0.75
OD_SPHEQUIV: 3.875

## 2024-01-29 ASSESSMENT — CONFRONTATIONAL VISUAL FIELD TEST (CVF)
OS_FINDINGS: FULL
OD_FINDINGS: FULL

## 2024-01-31 ENCOUNTER — OFFICE (OUTPATIENT)
Dept: URBAN - METROPOLITAN AREA CLINIC 94 | Facility: CLINIC | Age: 64
Setting detail: OPHTHALMOLOGY
End: 2024-01-31
Payer: COMMERCIAL

## 2024-01-31 DIAGNOSIS — E11.3511: ICD-10-CM

## 2024-01-31 PROCEDURE — 67210 TREATMENT OF RETINAL LESION: CPT | Mod: 79,RT | Performed by: OPHTHALMOLOGY

## 2024-02-17 ENCOUNTER — OFFICE (OUTPATIENT)
Dept: URBAN - METROPOLITAN AREA CLINIC 94 | Facility: CLINIC | Age: 64
Setting detail: OPHTHALMOLOGY
End: 2024-02-17
Payer: COMMERCIAL

## 2024-02-17 DIAGNOSIS — E11.3512: ICD-10-CM

## 2024-02-17 PROCEDURE — 67210 TREATMENT OF RETINAL LESION: CPT | Mod: 79,LT | Performed by: OPHTHALMOLOGY

## 2024-02-17 ASSESSMENT — REFRACTION_AUTOREFRACTION
OS_CYLINDER: -0.50
OD_AXIS: 101
OD_SPHERE: +4.50
OD_CYLINDER: -1.25
OS_SPHERE: +1.00
OS_AXIS: 178

## 2024-02-17 ASSESSMENT — CONFRONTATIONAL VISUAL FIELD TEST (CVF)
OS_FINDINGS: FULL
OD_FINDINGS: FULL

## 2024-02-17 ASSESSMENT — SPHEQUIV_DERIVED
OS_SPHEQUIV: 0.75
OD_SPHEQUIV: 3.875

## 2024-03-20 ENCOUNTER — OFFICE (OUTPATIENT)
Dept: URBAN - METROPOLITAN AREA CLINIC 105 | Facility: CLINIC | Age: 64
Setting detail: OPHTHALMOLOGY
End: 2024-03-20
Payer: COMMERCIAL

## 2024-03-20 DIAGNOSIS — E11.3511: ICD-10-CM

## 2024-03-20 DIAGNOSIS — E11.3513: ICD-10-CM

## 2024-03-20 PROCEDURE — 92134 CPTRZ OPH DX IMG PST SGM RTA: CPT | Performed by: OPHTHALMOLOGY

## 2024-03-20 PROCEDURE — 67028 INJECTION EYE DRUG: CPT | Mod: 58,RT | Performed by: OPHTHALMOLOGY

## 2024-04-08 ENCOUNTER — OFFICE (OUTPATIENT)
Dept: URBAN - METROPOLITAN AREA CLINIC 105 | Facility: CLINIC | Age: 64
Setting detail: OPHTHALMOLOGY
End: 2024-04-08
Payer: COMMERCIAL

## 2024-04-08 DIAGNOSIS — E11.3513: ICD-10-CM

## 2024-04-08 DIAGNOSIS — E11.3512: ICD-10-CM

## 2024-04-08 PROCEDURE — 99024 POSTOP FOLLOW-UP VISIT: CPT | Performed by: OPHTHALMOLOGY

## 2024-04-08 PROCEDURE — 67028 INJECTION EYE DRUG: CPT | Mod: 58,LT | Performed by: OPHTHALMOLOGY

## 2024-04-08 PROCEDURE — 92134 CPTRZ OPH DX IMG PST SGM RTA: CPT | Performed by: OPHTHALMOLOGY

## 2024-05-13 ENCOUNTER — OFFICE (OUTPATIENT)
Dept: URBAN - METROPOLITAN AREA CLINIC 105 | Facility: CLINIC | Age: 64
Setting detail: OPHTHALMOLOGY
End: 2024-05-13
Payer: COMMERCIAL

## 2024-05-13 DIAGNOSIS — E11.3513: ICD-10-CM

## 2024-05-13 DIAGNOSIS — E11.3511: ICD-10-CM

## 2024-05-13 PROCEDURE — 67210 TREATMENT OF RETINAL LESION: CPT | Mod: 79,RT | Performed by: OPHTHALMOLOGY

## 2024-05-13 PROCEDURE — 92134 CPTRZ OPH DX IMG PST SGM RTA: CPT | Performed by: OPHTHALMOLOGY

## 2024-05-13 ASSESSMENT — CONFRONTATIONAL VISUAL FIELD TEST (CVF)
OS_FINDINGS: FULL
OD_FINDINGS: FULL

## 2024-05-24 ENCOUNTER — OFFICE (OUTPATIENT)
Dept: URBAN - METROPOLITAN AREA CLINIC 105 | Facility: CLINIC | Age: 64
Setting detail: OPHTHALMOLOGY
End: 2024-05-24
Payer: COMMERCIAL

## 2024-05-24 DIAGNOSIS — E11.3513: ICD-10-CM

## 2024-05-24 DIAGNOSIS — E11.3511: ICD-10-CM

## 2024-05-24 DIAGNOSIS — E11.3512: ICD-10-CM

## 2024-05-24 PROCEDURE — 67028 INJECTION EYE DRUG: CPT | Mod: 58,RT | Performed by: OPHTHALMOLOGY

## 2024-05-24 PROCEDURE — 92134 CPTRZ OPH DX IMG PST SGM RTA: CPT | Performed by: OPHTHALMOLOGY

## 2024-05-24 PROCEDURE — 99024 POSTOP FOLLOW-UP VISIT: CPT | Mod: 58 | Performed by: OPHTHALMOLOGY

## 2024-05-24 ASSESSMENT — CONFRONTATIONAL VISUAL FIELD TEST (CVF)
OD_FINDINGS: FULL
OS_FINDINGS: FULL

## 2024-07-08 ENCOUNTER — OFFICE (OUTPATIENT)
Dept: URBAN - METROPOLITAN AREA CLINIC 105 | Facility: CLINIC | Age: 64
Setting detail: OPHTHALMOLOGY
End: 2024-07-08
Payer: COMMERCIAL

## 2024-07-08 DIAGNOSIS — E11.3512: ICD-10-CM

## 2024-07-08 PROCEDURE — 67210 TREATMENT OF RETINAL LESION: CPT | Mod: 79,LT | Performed by: OPHTHALMOLOGY

## 2024-07-08 ASSESSMENT — CONFRONTATIONAL VISUAL FIELD TEST (CVF)
OS_FINDINGS: FULL
OD_FINDINGS: FULL

## 2024-07-17 ENCOUNTER — OFFICE (OUTPATIENT)
Dept: URBAN - METROPOLITAN AREA CLINIC 105 | Facility: CLINIC | Age: 64
Setting detail: OPHTHALMOLOGY
End: 2024-07-17
Payer: COMMERCIAL

## 2024-07-17 DIAGNOSIS — E11.3511: ICD-10-CM

## 2024-07-17 DIAGNOSIS — E11.3513: ICD-10-CM

## 2024-07-17 PROCEDURE — 92134 CPTRZ OPH DX IMG PST SGM RTA: CPT | Performed by: OPHTHALMOLOGY

## 2024-07-17 PROCEDURE — 67028 INJECTION EYE DRUG: CPT | Mod: 58,RT | Performed by: OPHTHALMOLOGY

## 2024-07-17 ASSESSMENT — CONFRONTATIONAL VISUAL FIELD TEST (CVF)
OS_FINDINGS: FULL
OD_FINDINGS: FULL

## 2024-08-09 ENCOUNTER — OFFICE (OUTPATIENT)
Dept: URBAN - METROPOLITAN AREA CLINIC 105 | Facility: CLINIC | Age: 64
Setting detail: OPHTHALMOLOGY
End: 2024-08-09
Payer: COMMERCIAL

## 2024-08-09 DIAGNOSIS — E11.3512: ICD-10-CM

## 2024-08-09 DIAGNOSIS — E11.3511: ICD-10-CM

## 2024-08-09 DIAGNOSIS — E11.3513: ICD-10-CM

## 2024-08-09 PROCEDURE — 99024 POSTOP FOLLOW-UP VISIT: CPT | Mod: 58 | Performed by: OPHTHALMOLOGY

## 2024-08-09 PROCEDURE — 92134 CPTRZ OPH DX IMG PST SGM RTA: CPT | Performed by: OPHTHALMOLOGY

## 2024-08-09 PROCEDURE — 67028 INJECTION EYE DRUG: CPT | Mod: 58,LT | Performed by: OPHTHALMOLOGY

## 2024-08-09 ASSESSMENT — CONFRONTATIONAL VISUAL FIELD TEST (CVF)
OS_FINDINGS: FULL
OD_FINDINGS: FULL

## 2024-09-09 ENCOUNTER — OFFICE (OUTPATIENT)
Dept: URBAN - METROPOLITAN AREA CLINIC 105 | Facility: CLINIC | Age: 64
Setting detail: OPHTHALMOLOGY
End: 2024-09-09
Payer: COMMERCIAL

## 2024-09-09 DIAGNOSIS — E11.3511: ICD-10-CM

## 2024-09-09 PROCEDURE — 67210 TREATMENT OF RETINAL LESION: CPT | Mod: 79,RT | Performed by: OPHTHALMOLOGY

## 2024-09-09 ASSESSMENT — CONFRONTATIONAL VISUAL FIELD TEST (CVF)
OS_FINDINGS: FULL
OD_FINDINGS: FULL

## 2024-10-11 ENCOUNTER — OFFICE (OUTPATIENT)
Dept: URBAN - METROPOLITAN AREA CLINIC 105 | Facility: CLINIC | Age: 64
Setting detail: OPHTHALMOLOGY
End: 2024-10-11
Payer: COMMERCIAL

## 2024-10-11 DIAGNOSIS — E11.3512: ICD-10-CM

## 2024-10-11 PROCEDURE — 67210 TREATMENT OF RETINAL LESION: CPT | Mod: 79,LT | Performed by: OPHTHALMOLOGY

## 2024-10-11 ASSESSMENT — VISUAL ACUITY
OS_BCVA: 20/200-
OD_BCVA: 20/30

## 2024-10-11 ASSESSMENT — KERATOMETRY
OD_K2POWER_DIOPTERS: 46.00
OD_K1POWER_DIOPTERS: 45.50
OD_AXISANGLE_DEGREES: 058
OS_K1POWER_DIOPTERS: 46.00
OS_AXISANGLE_DEGREES: 099
OS_K2POWER_DIOPTERS: 47.50

## 2024-10-11 ASSESSMENT — REFRACTION_AUTOREFRACTION
OD_AXIS: 101
OS_CYLINDER: -0.50
OS_SPHERE: +1.00
OD_SPHERE: +4.50
OD_CYLINDER: -1.25
OS_AXIS: 178

## 2024-10-11 ASSESSMENT — CONFRONTATIONAL VISUAL FIELD TEST (CVF)
OS_FINDINGS: FULL
OD_FINDINGS: FULL

## 2024-10-11 ASSESSMENT — TONOMETRY: OD_IOP_MMHG: 19

## 2024-10-14 ENCOUNTER — OFFICE (OUTPATIENT)
Dept: URBAN - METROPOLITAN AREA CLINIC 105 | Facility: CLINIC | Age: 64
Setting detail: OPHTHALMOLOGY
End: 2024-10-14
Payer: COMMERCIAL

## 2024-10-14 DIAGNOSIS — E11.3513: ICD-10-CM

## 2024-10-14 DIAGNOSIS — E11.3511: ICD-10-CM

## 2024-10-14 PROCEDURE — 99024 POSTOP FOLLOW-UP VISIT: CPT | Performed by: OPHTHALMOLOGY

## 2024-10-14 PROCEDURE — 67028 INJECTION EYE DRUG: CPT | Mod: 58,RT | Performed by: OPHTHALMOLOGY

## 2024-10-14 PROCEDURE — 92134 CPTRZ OPH DX IMG PST SGM RTA: CPT | Performed by: OPHTHALMOLOGY

## 2024-10-14 ASSESSMENT — VISUAL ACUITY
OD_BCVA: 20/20
OS_BCVA: 20/150-

## 2024-10-14 ASSESSMENT — KERATOMETRY
OD_AXISANGLE_DEGREES: 058
OS_K2POWER_DIOPTERS: 47.50
OS_AXISANGLE_DEGREES: 099
OD_K1POWER_DIOPTERS: 45.50
OS_K1POWER_DIOPTERS: 46.00
OD_K2POWER_DIOPTERS: 46.00

## 2024-10-14 ASSESSMENT — CONFRONTATIONAL VISUAL FIELD TEST (CVF)
OS_FINDINGS: FULL
OD_FINDINGS: FULL

## 2024-10-14 ASSESSMENT — TONOMETRY
OS_IOP_MMHG: 20
OD_IOP_MMHG: 21

## 2024-10-14 ASSESSMENT — REFRACTION_AUTOREFRACTION
OS_CYLINDER: -0.50
OS_AXIS: 178
OD_CYLINDER: -1.25
OD_SPHERE: +4.50
OD_AXIS: 101
OS_SPHERE: +1.00

## 2024-11-20 ENCOUNTER — OFFICE (OUTPATIENT)
Dept: URBAN - METROPOLITAN AREA CLINIC 105 | Facility: CLINIC | Age: 64
Setting detail: OPHTHALMOLOGY
End: 2024-11-20
Payer: COMMERCIAL

## 2024-11-20 DIAGNOSIS — E11.3511: ICD-10-CM

## 2024-11-20 DIAGNOSIS — E11.3513: ICD-10-CM

## 2024-11-20 PROCEDURE — 67028 INJECTION EYE DRUG: CPT | Mod: 58,RT | Performed by: OPHTHALMOLOGY

## 2024-11-20 PROCEDURE — 99024 POSTOP FOLLOW-UP VISIT: CPT | Performed by: OPHTHALMOLOGY

## 2024-11-20 PROCEDURE — 92134 CPTRZ OPH DX IMG PST SGM RTA: CPT | Performed by: OPHTHALMOLOGY

## 2024-11-20 ASSESSMENT — REFRACTION_AUTOREFRACTION
OS_CYLINDER: -0.50
OD_AXIS: 101
OS_SPHERE: +1.00
OS_AXIS: 178
OD_SPHERE: +4.50
OD_CYLINDER: -1.25

## 2024-11-20 ASSESSMENT — KERATOMETRY
OS_K2POWER_DIOPTERS: 47.50
OD_AXISANGLE_DEGREES: 058
OS_AXISANGLE_DEGREES: 099
OD_K1POWER_DIOPTERS: 45.50
OD_K2POWER_DIOPTERS: 46.00
OS_K1POWER_DIOPTERS: 46.00

## 2024-11-20 ASSESSMENT — CONFRONTATIONAL VISUAL FIELD TEST (CVF)
OS_FINDINGS: FULL
OD_FINDINGS: FULL

## 2024-11-20 ASSESSMENT — TONOMETRY: OD_IOP_MMHG: 20

## 2024-11-20 ASSESSMENT — VISUAL ACUITY
OS_BCVA: 20/150
OD_BCVA: 20/25+2

## 2024-12-03 ENCOUNTER — OFFICE (OUTPATIENT)
Dept: URBAN - METROPOLITAN AREA CLINIC 94 | Facility: CLINIC | Age: 64
Setting detail: OPHTHALMOLOGY
End: 2024-12-03
Payer: COMMERCIAL

## 2024-12-03 DIAGNOSIS — E11.3513: ICD-10-CM

## 2024-12-03 DIAGNOSIS — E11.3512: ICD-10-CM

## 2024-12-03 PROCEDURE — 92134 CPTRZ OPH DX IMG PST SGM RTA: CPT | Performed by: OPHTHALMOLOGY

## 2024-12-03 PROCEDURE — 99024 POSTOP FOLLOW-UP VISIT: CPT | Performed by: OPHTHALMOLOGY

## 2024-12-03 PROCEDURE — 67028 INJECTION EYE DRUG: CPT | Mod: 58,LT | Performed by: OPHTHALMOLOGY

## 2024-12-03 ASSESSMENT — REFRACTION_AUTOREFRACTION
OS_SPHERE: +1.00
OD_CYLINDER: -1.25
OD_SPHERE: +4.50
OS_AXIS: 178
OS_CYLINDER: -0.50
OD_AXIS: 101

## 2024-12-03 ASSESSMENT — CONFRONTATIONAL VISUAL FIELD TEST (CVF)
OD_FINDINGS: FULL
OS_FINDINGS: FULL

## 2024-12-03 ASSESSMENT — KERATOMETRY
OS_AXISANGLE_DEGREES: 099
OS_K2POWER_DIOPTERS: 47.50
OD_AXISANGLE_DEGREES: 058
OD_K2POWER_DIOPTERS: 46.00
OS_K1POWER_DIOPTERS: 46.00
OD_K1POWER_DIOPTERS: 45.50

## 2024-12-03 ASSESSMENT — VISUAL ACUITY
OD_BCVA: 20/20
OS_BCVA: 20/80+1

## 2024-12-03 ASSESSMENT — TONOMETRY: OD_IOP_MMHG: 21

## 2025-01-27 ENCOUNTER — OFFICE (OUTPATIENT)
Dept: URBAN - METROPOLITAN AREA CLINIC 105 | Facility: CLINIC | Age: 65
Setting detail: OPHTHALMOLOGY
End: 2025-01-27
Payer: COMMERCIAL

## 2025-01-27 DIAGNOSIS — E11.3513: ICD-10-CM

## 2025-01-27 DIAGNOSIS — E11.3511: ICD-10-CM

## 2025-01-27 PROCEDURE — 92134 CPTRZ OPH DX IMG PST SGM RTA: CPT | Performed by: OPHTHALMOLOGY

## 2025-01-27 PROCEDURE — 67028 INJECTION EYE DRUG: CPT | Mod: RT | Performed by: OPHTHALMOLOGY

## 2025-01-27 ASSESSMENT — TONOMETRY
OS_IOP_MMHG: 21
OD_IOP_MMHG: 19

## 2025-01-27 ASSESSMENT — CONFRONTATIONAL VISUAL FIELD TEST (CVF)
OD_FINDINGS: FULL
OS_FINDINGS: FULL

## 2025-01-27 ASSESSMENT — REFRACTION_AUTOREFRACTION
OS_AXIS: 178
OD_AXIS: 101
OS_CYLINDER: -0.50
OS_SPHERE: +1.00
OD_SPHERE: +4.50
OD_CYLINDER: -1.25

## 2025-01-27 ASSESSMENT — KERATOMETRY
OS_AXISANGLE_DEGREES: 099
OS_K2POWER_DIOPTERS: 47.50
OS_K1POWER_DIOPTERS: 46.00
OD_K2POWER_DIOPTERS: 46.00
OD_K1POWER_DIOPTERS: 45.50
OD_AXISANGLE_DEGREES: 058

## 2025-01-27 ASSESSMENT — VISUAL ACUITY
OS_BCVA: 20/200
OD_BCVA: 20/20

## 2025-02-24 ENCOUNTER — OFFICE (OUTPATIENT)
Dept: URBAN - METROPOLITAN AREA CLINIC 105 | Facility: CLINIC | Age: 65
Setting detail: OPHTHALMOLOGY
End: 2025-02-24
Payer: COMMERCIAL

## 2025-02-24 DIAGNOSIS — E11.3512: ICD-10-CM

## 2025-02-24 PROCEDURE — 67210 TREATMENT OF RETINAL LESION: CPT | Mod: 79,LT | Performed by: OPHTHALMOLOGY

## 2025-02-24 ASSESSMENT — KERATOMETRY
OD_K2POWER_DIOPTERS: 46.00
OD_AXISANGLE_DEGREES: 058
OS_K1POWER_DIOPTERS: 46.00
OS_K2POWER_DIOPTERS: 47.50
OD_K1POWER_DIOPTERS: 45.50
OS_AXISANGLE_DEGREES: 099

## 2025-02-24 ASSESSMENT — VISUAL ACUITY
OS_BCVA: 20/200
OD_BCVA: 20/20-1

## 2025-02-24 ASSESSMENT — REFRACTION_AUTOREFRACTION
OS_CYLINDER: -0.50
OS_SPHERE: +1.00
OD_CYLINDER: -1.25
OD_SPHERE: +4.50
OS_AXIS: 178
OD_AXIS: 101

## 2025-02-24 ASSESSMENT — CONFRONTATIONAL VISUAL FIELD TEST (CVF)
OD_FINDINGS: FULL
OS_FINDINGS: FULL

## 2025-02-24 ASSESSMENT — TONOMETRY: OD_IOP_MMHG: 21

## 2025-03-10 ENCOUNTER — OFFICE (OUTPATIENT)
Dept: URBAN - METROPOLITAN AREA CLINIC 105 | Facility: CLINIC | Age: 65
Setting detail: OPHTHALMOLOGY
End: 2025-03-10
Payer: COMMERCIAL

## 2025-03-10 DIAGNOSIS — E11.3511: ICD-10-CM

## 2025-03-10 DIAGNOSIS — E11.3513: ICD-10-CM

## 2025-03-10 PROCEDURE — 92134 CPTRZ OPH DX IMG PST SGM RTA: CPT | Performed by: OPHTHALMOLOGY

## 2025-03-10 PROCEDURE — 67028 INJECTION EYE DRUG: CPT | Mod: 58,RT | Performed by: OPHTHALMOLOGY

## 2025-03-10 ASSESSMENT — REFRACTION_AUTOREFRACTION
OS_SPHERE: +1.00
OD_AXIS: 101
OD_SPHERE: +4.50
OS_CYLINDER: -0.50
OS_AXIS: 178
OD_CYLINDER: -1.25

## 2025-03-10 ASSESSMENT — KERATOMETRY
OD_AXISANGLE_DEGREES: 058
OD_K2POWER_DIOPTERS: 46.00
OD_K1POWER_DIOPTERS: 45.50
OS_AXISANGLE_DEGREES: 099
OS_K2POWER_DIOPTERS: 47.50
OS_K1POWER_DIOPTERS: 46.00

## 2025-03-10 ASSESSMENT — VISUAL ACUITY
OS_BCVA: 20/200
OD_BCVA: 20/20-1

## 2025-03-10 ASSESSMENT — TONOMETRY
OS_IOP_MMHG: 20
OD_IOP_MMHG: 20

## 2025-03-10 ASSESSMENT — CONFRONTATIONAL VISUAL FIELD TEST (CVF)
OS_FINDINGS: FULL
OD_FINDINGS: FULL

## 2025-04-25 ENCOUNTER — OFFICE (OUTPATIENT)
Dept: URBAN - METROPOLITAN AREA CLINIC 105 | Facility: CLINIC | Age: 65
Setting detail: OPHTHALMOLOGY
End: 2025-04-25
Payer: COMMERCIAL

## 2025-04-25 DIAGNOSIS — E11.3511: ICD-10-CM

## 2025-04-25 DIAGNOSIS — E11.3513: ICD-10-CM

## 2025-04-25 PROCEDURE — 67028 INJECTION EYE DRUG: CPT | Mod: 79,RT | Performed by: OPHTHALMOLOGY

## 2025-04-25 PROCEDURE — 92134 CPTRZ OPH DX IMG PST SGM RTA: CPT | Performed by: OPHTHALMOLOGY

## 2025-04-25 ASSESSMENT — KERATOMETRY
OD_AXISANGLE_DEGREES: 058
OS_K1POWER_DIOPTERS: 46.00
OS_AXISANGLE_DEGREES: 099
OS_K2POWER_DIOPTERS: 47.50
OD_K1POWER_DIOPTERS: 45.50
OD_K2POWER_DIOPTERS: 46.00

## 2025-04-25 ASSESSMENT — CONFRONTATIONAL VISUAL FIELD TEST (CVF)
OS_FINDINGS: FULL
OD_FINDINGS: FULL

## 2025-04-25 ASSESSMENT — REFRACTION_AUTOREFRACTION
OD_CYLINDER: -1.25
OD_AXIS: 101
OS_AXIS: 178
OS_CYLINDER: -0.50
OD_SPHERE: +4.50
OS_SPHERE: +1.00

## 2025-04-25 ASSESSMENT — VISUAL ACUITY
OS_BCVA: 20/200
OD_BCVA: 20/20-1

## 2025-05-09 ENCOUNTER — OFFICE (OUTPATIENT)
Dept: URBAN - METROPOLITAN AREA CLINIC 105 | Facility: CLINIC | Age: 65
Setting detail: OPHTHALMOLOGY
End: 2025-05-09
Payer: COMMERCIAL

## 2025-05-09 DIAGNOSIS — E11.3512: ICD-10-CM

## 2025-05-09 DIAGNOSIS — E11.3513: ICD-10-CM

## 2025-05-09 PROCEDURE — 67028 INJECTION EYE DRUG: CPT | Mod: 58,LT | Performed by: OPHTHALMOLOGY

## 2025-05-09 PROCEDURE — 92134 CPTRZ OPH DX IMG PST SGM RTA: CPT | Performed by: OPHTHALMOLOGY

## 2025-05-09 ASSESSMENT — KERATOMETRY
OD_K2POWER_DIOPTERS: 46.00
OS_AXISANGLE_DEGREES: 099
OS_K1POWER_DIOPTERS: 46.00
OS_K2POWER_DIOPTERS: 47.50
OD_AXISANGLE_DEGREES: 058
OD_K1POWER_DIOPTERS: 45.50

## 2025-05-09 ASSESSMENT — TONOMETRY: OD_IOP_MMHG: 18

## 2025-05-09 ASSESSMENT — CONFRONTATIONAL VISUAL FIELD TEST (CVF)
OS_FINDINGS: FULL
OD_FINDINGS: FULL

## 2025-05-09 ASSESSMENT — REFRACTION_AUTOREFRACTION
OS_SPHERE: +1.00
OS_CYLINDER: -0.50
OD_AXIS: 101
OD_SPHERE: +4.50
OS_AXIS: 178
OD_CYLINDER: -1.25

## 2025-05-09 ASSESSMENT — VISUAL ACUITY
OD_BCVA: 20/25-
OS_BCVA: 20/100-

## 2025-06-13 ENCOUNTER — OFFICE (OUTPATIENT)
Dept: URBAN - METROPOLITAN AREA CLINIC 105 | Facility: CLINIC | Age: 65
Setting detail: OPHTHALMOLOGY
End: 2025-06-13
Payer: COMMERCIAL

## 2025-06-13 DIAGNOSIS — E11.3513: ICD-10-CM

## 2025-06-13 DIAGNOSIS — E11.3511: ICD-10-CM

## 2025-06-13 PROCEDURE — 92134 CPTRZ OPH DX IMG PST SGM RTA: CPT | Performed by: OPHTHALMOLOGY

## 2025-06-13 PROCEDURE — 67028 INJECTION EYE DRUG: CPT | Mod: RT | Performed by: OPHTHALMOLOGY

## 2025-06-13 ASSESSMENT — CONFRONTATIONAL VISUAL FIELD TEST (CVF)
OD_FINDINGS: FULL
OS_FINDINGS: FULL

## 2025-06-13 ASSESSMENT — VISUAL ACUITY
OD_BCVA: 20/25-
OS_BCVA: 20/100-1

## 2025-06-13 ASSESSMENT — REFRACTION_AUTOREFRACTION
OD_AXIS: 101
OS_AXIS: 178
OD_CYLINDER: -1.25
OD_SPHERE: +4.50
OS_SPHERE: +1.00
OS_CYLINDER: -0.50

## 2025-06-13 ASSESSMENT — KERATOMETRY
OD_K1POWER_DIOPTERS: 45.50
OS_AXISANGLE_DEGREES: 099
OS_K1POWER_DIOPTERS: 46.00
OS_K2POWER_DIOPTERS: 47.50
OD_AXISANGLE_DEGREES: 058
OD_K2POWER_DIOPTERS: 46.00

## 2025-06-16 ENCOUNTER — OFFICE (OUTPATIENT)
Dept: URBAN - METROPOLITAN AREA CLINIC 63 | Facility: CLINIC | Age: 65
Setting detail: OPHTHALMOLOGY
End: 2025-06-16
Payer: COMMERCIAL

## 2025-06-16 DIAGNOSIS — E11.3511: ICD-10-CM

## 2025-06-16 PROCEDURE — 67210 TREATMENT OF RETINAL LESION: CPT | Mod: RT | Performed by: OPHTHALMOLOGY

## 2025-06-16 ASSESSMENT — REFRACTION_AUTOREFRACTION
OD_AXIS: 101
OD_CYLINDER: -1.25
OS_SPHERE: +1.00
OS_AXIS: 178
OD_SPHERE: +4.50
OS_CYLINDER: -0.50

## 2025-06-16 ASSESSMENT — KERATOMETRY
OD_K1POWER_DIOPTERS: 45.50
OD_AXISANGLE_DEGREES: 058
OS_K1POWER_DIOPTERS: 46.00
OD_K2POWER_DIOPTERS: 46.00
OS_K2POWER_DIOPTERS: 47.50
OS_AXISANGLE_DEGREES: 099

## 2025-06-16 ASSESSMENT — TONOMETRY
OD_IOP_MMHG: 21
OS_IOP_MMHG: 18

## 2025-06-16 ASSESSMENT — VISUAL ACUITY
OS_BCVA: 20/150
OD_BCVA: 20/20

## 2025-06-23 ENCOUNTER — OFFICE (OUTPATIENT)
Dept: URBAN - METROPOLITAN AREA CLINIC 105 | Facility: CLINIC | Age: 65
Setting detail: OPHTHALMOLOGY
End: 2025-06-23
Payer: COMMERCIAL

## 2025-06-23 DIAGNOSIS — E11.3512: ICD-10-CM

## 2025-06-23 PROCEDURE — 67210 TREATMENT OF RETINAL LESION: CPT | Mod: 79,LT | Performed by: OPHTHALMOLOGY

## 2025-06-23 ASSESSMENT — KERATOMETRY
OD_K1POWER_DIOPTERS: 45.50
OS_AXISANGLE_DEGREES: 099
OD_AXISANGLE_DEGREES: 058
OS_K1POWER_DIOPTERS: 46.00
OD_K2POWER_DIOPTERS: 46.00
OS_K2POWER_DIOPTERS: 47.50

## 2025-06-23 ASSESSMENT — CONFRONTATIONAL VISUAL FIELD TEST (CVF)
OS_FINDINGS: FULL
OD_FINDINGS: FULL

## 2025-06-23 ASSESSMENT — REFRACTION_AUTOREFRACTION
OD_CYLINDER: -1.25
OS_CYLINDER: -0.50
OD_SPHERE: +4.50
OD_AXIS: 101
OS_SPHERE: +1.00
OS_AXIS: 178

## 2025-06-23 ASSESSMENT — VISUAL ACUITY
OS_BCVA: 20/150-
OD_BCVA: 20/20

## 2025-06-23 ASSESSMENT — TONOMETRY: OD_IOP_MMHG: 20

## (undated) DEVICE — DRSG TELFA 3 X 8

## (undated) DEVICE — TUBING IRRIGATION STRAIGHT SHOT

## (undated) DEVICE — ACCLARENT SET INFLATION DEVICE

## (undated) DEVICE — CANISTER DISPOSABLE THIN WALL 3000CC

## (undated) DEVICE — BLADE MEDTRONIC ENT FUSION TRICUT ROTATABLE STRAIGHT 4MM X 13CM

## (undated) DEVICE — PACK SMR

## (undated) DEVICE — SUT PLAIN GUT 4-0 18" SC-1

## (undated) DEVICE — POSITIONER FOAM EGG CRATE ULNAR 2PCS (PINK)

## (undated) DEVICE — VENODYNE/SCD SLEEVE CALF MEDIUM

## (undated) DEVICE — SOL ANTI FOG

## (undated) DEVICE — DRSG NASOPORE 8CM FIRM

## (undated) DEVICE — CLEANING SHEATH ENDO-SCRUB FOR STORZ 7210AA TELESCOPE 4MM 0 DEGREE

## (undated) DEVICE — STRYKER MALLEABLE SUCTION MEDIUM STANDARD

## (undated) DEVICE — DRSG SPLINT INTRA NASAL .5MM OVERSIZE THICK

## (undated) DEVICE — MEDTRONIC INSTRUMENT TRACKER ENT

## (undated) DEVICE — SUT CHROMIC 4-0 18" G-2

## (undated) DEVICE — DRSG SPLINT INTRA NASAL .5MM STANDARD THICK

## (undated) DEVICE — LIJ-MEDTRONIC FUSION ENT NAVIGATION SET: Type: DURABLE MEDICAL EQUIPMENT

## (undated) DEVICE — WARMING BLANKET FULL UNDERBODY

## (undated) DEVICE — POSITIONER STRAP ARMBOARD VELCRO TS-30

## (undated) DEVICE — TUBING SUCTION NONCONDUCTIVE 6MM X 12FT

## (undated) DEVICE — SYR LUER LOK 5CC

## (undated) DEVICE — CATH IV SAFE INSYTE 14G X 1.75" (ORANGE)

## (undated) DEVICE — Device

## (undated) DEVICE — SYR CONTROL LUER LOK 10CC

## (undated) DEVICE — NDL HYPO REGULAR BEVEL 25G X 1.5" (BLUE)

## (undated) DEVICE — MEDTRONIC AXIEM PATIENT TRACKER NON-INVASIVE

## (undated) DEVICE — ELCTR BOVIE SUCTION 8FR 6"

## (undated) DEVICE — ENDO SCRUB

## (undated) DEVICE — BLADE MEDTRONIC ENT TRICUT ROTATABLE STRAIGHT 4MM X 11CM

## (undated) DEVICE — PAD MEDTRONIC ENT ADHESIVE PAD

## (undated) DEVICE — DRSG NASOPORE 4CM FIRM

## (undated) DEVICE — SUT ETHILON 3-0 30" FS-1

## (undated) DEVICE — LABELS BLANK W PEN